# Patient Record
Sex: MALE | Race: WHITE | NOT HISPANIC OR LATINO | URBAN - METROPOLITAN AREA
[De-identification: names, ages, dates, MRNs, and addresses within clinical notes are randomized per-mention and may not be internally consistent; named-entity substitution may affect disease eponyms.]

---

## 2020-07-24 ENCOUNTER — OUTPATIENT (OUTPATIENT)
Dept: OUTPATIENT SERVICES | Facility: HOSPITAL | Age: 65
LOS: 1 days | End: 2020-07-24
Payer: COMMERCIAL

## 2020-07-24 VITALS
OXYGEN SATURATION: 98 % | HEART RATE: 72 BPM | RESPIRATION RATE: 16 BRPM | HEIGHT: 67 IN | TEMPERATURE: 97 F | WEIGHT: 194.01 LBS | DIASTOLIC BLOOD PRESSURE: 70 MMHG | SYSTOLIC BLOOD PRESSURE: 118 MMHG

## 2020-07-24 DIAGNOSIS — Z93.3 COLOSTOMY STATUS: Chronic | ICD-10-CM

## 2020-07-24 DIAGNOSIS — Z90.49 ACQUIRED ABSENCE OF OTHER SPECIFIED PARTS OF DIGESTIVE TRACT: Chronic | ICD-10-CM

## 2020-07-24 DIAGNOSIS — Z87.438 PERSONAL HISTORY OF OTHER DISEASES OF MALE GENITAL ORGANS: ICD-10-CM

## 2020-07-24 DIAGNOSIS — E78.5 HYPERLIPIDEMIA, UNSPECIFIED: ICD-10-CM

## 2020-07-24 DIAGNOSIS — D16.4 BENIGN NEOPLASM OF BONES OF SKULL AND FACE: ICD-10-CM

## 2020-07-24 DIAGNOSIS — I10 ESSENTIAL (PRIMARY) HYPERTENSION: ICD-10-CM

## 2020-07-24 DIAGNOSIS — Z98.890 OTHER SPECIFIED POSTPROCEDURAL STATES: Chronic | ICD-10-CM

## 2020-07-24 DIAGNOSIS — K08.409 PARTIAL LOSS OF TEETH, UNSPECIFIED CAUSE, UNSPECIFIED CLASS: Chronic | ICD-10-CM

## 2020-07-24 DIAGNOSIS — Z87.39 PERSONAL HISTORY OF OTHER DISEASES OF THE MUSCULOSKELETAL SYSTEM AND CONNECTIVE TISSUE: ICD-10-CM

## 2020-07-24 LAB
ANION GAP SERPL CALC-SCNC: 8 MMO/L — SIGNIFICANT CHANGE UP (ref 7–14)
BLD GP AB SCN SERPL QL: NEGATIVE — SIGNIFICANT CHANGE UP
BUN SERPL-MCNC: 21 MG/DL — SIGNIFICANT CHANGE UP (ref 7–23)
CALCIUM SERPL-MCNC: 9.7 MG/DL — SIGNIFICANT CHANGE UP (ref 8.4–10.5)
CHLORIDE SERPL-SCNC: 105 MMOL/L — SIGNIFICANT CHANGE UP (ref 98–107)
CO2 SERPL-SCNC: 26 MMOL/L — SIGNIFICANT CHANGE UP (ref 22–31)
CREAT SERPL-MCNC: 0.75 MG/DL — SIGNIFICANT CHANGE UP (ref 0.5–1.3)
GLUCOSE SERPL-MCNC: 96 MG/DL — SIGNIFICANT CHANGE UP (ref 70–99)
HCT VFR BLD CALC: 43 % — SIGNIFICANT CHANGE UP (ref 39–50)
HGB BLD-MCNC: 14 G/DL — SIGNIFICANT CHANGE UP (ref 13–17)
MCHC RBC-ENTMCNC: 28.7 PG — SIGNIFICANT CHANGE UP (ref 27–34)
MCHC RBC-ENTMCNC: 32.6 % — SIGNIFICANT CHANGE UP (ref 32–36)
MCV RBC AUTO: 88.1 FL — SIGNIFICANT CHANGE UP (ref 80–100)
NRBC # FLD: 0 K/UL — SIGNIFICANT CHANGE UP (ref 0–0)
PLATELET # BLD AUTO: 180 K/UL — SIGNIFICANT CHANGE UP (ref 150–400)
PMV BLD: 12.2 FL — SIGNIFICANT CHANGE UP (ref 7–13)
POTASSIUM SERPL-MCNC: 4.5 MMOL/L — SIGNIFICANT CHANGE UP (ref 3.5–5.3)
POTASSIUM SERPL-SCNC: 4.5 MMOL/L — SIGNIFICANT CHANGE UP (ref 3.5–5.3)
RBC # BLD: 4.88 M/UL — SIGNIFICANT CHANGE UP (ref 4.2–5.8)
RBC # FLD: 13.2 % — SIGNIFICANT CHANGE UP (ref 10.3–14.5)
RH IG SCN BLD-IMP: POSITIVE — SIGNIFICANT CHANGE UP
SODIUM SERPL-SCNC: 139 MMOL/L — SIGNIFICANT CHANGE UP (ref 135–145)
WBC # BLD: 6.23 K/UL — SIGNIFICANT CHANGE UP (ref 3.8–10.5)
WBC # FLD AUTO: 6.23 K/UL — SIGNIFICANT CHANGE UP (ref 3.8–10.5)

## 2020-07-24 PROCEDURE — 93010 ELECTROCARDIOGRAM REPORT: CPT

## 2020-07-24 RX ORDER — SODIUM CHLORIDE 9 MG/ML
1000 INJECTION, SOLUTION INTRAVENOUS
Refills: 0 | Status: DISCONTINUED | OUTPATIENT
Start: 2020-08-03 | End: 2020-08-15

## 2020-07-24 NOTE — H&P PST ADULT - ATTENDING COMMENTS
indicated for removal of cystic lesion of maxilla and associated teeth with bone graft region in OR under GA

## 2020-07-24 NOTE — H&P PST ADULT - NEGATIVE ENMT SYMPTOMS
no dysphagia/no nose bleeds/no throat pain/no ear pain/no tinnitus/no vertigo/no sinus symptoms/no hearing difficulty

## 2020-07-24 NOTE — H&P PST ADULT - NEGATIVE CARDIOVASCULAR SYMPTOMS
no paroxysmal nocturnal dyspnea/no chest pain/no dyspnea on exertion/no palpitations/no peripheral edema

## 2020-07-24 NOTE — H&P PST ADULT - CONSTITUTIONAL DETAILS
well-developed/well-nourished/well-groomed/no distress well-developed/well-groomed/well-nourished/obese/no distress

## 2020-07-24 NOTE — H&P PST ADULT - NSWEIGHTCALCTOOLDRUG_GEN_A_CORE
Problem: Patient Care Overview  Goal: Plan of Care Review  Outcome: Ongoing (interventions implemented as appropriate)  POC reviewed with the patient and daughter. Verbalized understanding. Fall precautions and safety maintained. No falls/injury this shift. No new skin impairments noted. AAOx1- oriented to self. Afebrile. VSS. Face mask and IVF @ 40 ml/hr maintained.  Pt progressing toward goals. No cognitive/physical changes noted overnight. Will continue to monitor.         used

## 2020-07-24 NOTE — H&P PST ADULT - RS GEN PE MLT RESP DETAILS PC
good air movement/breath sounds equal/airway patent/respirations non-labored/no rhonchi/clear to auscultation bilaterally/no rales/no wheezes

## 2020-07-24 NOTE — H&P PST ADULT - NSANTHOSAYNRD_GEN_A_CORE
No. CATRACHO screening performed.  STOP BANG Legend: 0-2 = LOW Risk; 3-4 = INTERMEDIATE Risk; 5-8 = HIGH Risk

## 2020-07-24 NOTE — H&P PST ADULT - NEGATIVE OPHTHALMOLOGIC SYMPTOMS
no pain R/no loss of vision L/no pain L/no loss of vision R/no photophobia/no blurred vision L/no diplopia/no blurred vision R

## 2020-07-24 NOTE — H&P PST ADULT - NSICDXPASTMEDICALHX_GEN_ALL_CORE_FT
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
## 2020-07-24 NOTE — H&P PST ADULT - MUSCULOSKELETAL
details… detailed exam no joint erythema/no joint swelling/no joint warmth/no calf tenderness/normal strength/ROM intact

## 2020-07-24 NOTE — H&P PST ADULT - NSICDXPROBLEM_GEN_ALL_CORE_FT
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 Pre-op instructions provided. Pt given verbal and written instructions with teach back on pepcid. Preop COVID instructions provided. Pt verbalized understanding with return demonstration.   CATRACHO precautions, OR booking notified  Pending cardiac evaluation due to abnormal EKG, h/o HTN, "heart valve problem" as per patient.  Pending copy of echo and stress test and comparison ekg.  Instructed patient obtain cardiac evaluation.     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 Pre-op instructions provided. Pt given verbal and written instructions with teach back on pepcid. Preop COVID instructions provided. Pt verbalized understanding with return demonstration.   ACTRACHO precautions, OR booking notified  Pending cardiac evaluation due to abnormal EKG, h/o HTN, "heart valve problem" as per patient. Notified [facility] surgical coordinator  Pending copy of echo and stress test and comparison ekg.  Instructed patient obtain cardiac evaluation.     Problem: History of BPH  Assessment and Plan: Pt instructed to take finasteride on the morning of procedure     Problem: Hypertension  Assessment and Plan: Pt instructed to take cardizem and lisinopril on the morning of procedure

## 2020-07-24 NOTE — H&P PST ADULT - NEGATIVE NEUROLOGICAL SYMPTOMS
no syncope/no generalized seizures/no weakness/no focal seizures/no difficulty walking/no paresthesias/no headache/no transient paralysis

## 2020-07-24 NOTE — H&P PST ADULT - HISTORY OF PRESENT ILLNESS
65 year old male presents to presurgical testing with diagnosis of benign neoplasm of bones of skull and face scheduled for extraction of teeth #8, 9, 10, 11, 12 maxillary cyst removal bone graft maxilla. Pt reports a maxillary cyst which a result of an old injury ate age 9 or 10.

## 2020-07-28 DIAGNOSIS — Z01.818 ENCOUNTER FOR OTHER PREPROCEDURAL EXAMINATION: ICD-10-CM

## 2020-07-28 PROBLEM — Z00.00 ENCOUNTER FOR PREVENTIVE HEALTH EXAMINATION: Status: ACTIVE | Noted: 2020-07-28

## 2020-07-30 ENCOUNTER — TRANSCRIPTION ENCOUNTER (OUTPATIENT)
Age: 65
End: 2020-07-30

## 2020-07-31 ENCOUNTER — OUTPATIENT (OUTPATIENT)
Dept: OUTPATIENT SERVICES | Facility: HOSPITAL | Age: 65
LOS: 1 days | Discharge: HOME | End: 2020-07-31
Payer: COMMERCIAL

## 2020-07-31 ENCOUNTER — APPOINTMENT (OUTPATIENT)
Dept: DISASTER EMERGENCY | Facility: CLINIC | Age: 65
End: 2020-07-31

## 2020-07-31 VITALS
OXYGEN SATURATION: 99 % | RESPIRATION RATE: 16 BRPM | HEART RATE: 86 BPM | DIASTOLIC BLOOD PRESSURE: 99 MMHG | SYSTOLIC BLOOD PRESSURE: 130 MMHG | TEMPERATURE: 99 F

## 2020-07-31 VITALS
HEIGHT: 70 IN | RESPIRATION RATE: 16 BRPM | TEMPERATURE: 99 F | HEART RATE: 92 BPM | SYSTOLIC BLOOD PRESSURE: 108 MMHG | OXYGEN SATURATION: 95 % | WEIGHT: 195.11 LBS | DIASTOLIC BLOOD PRESSURE: 67 MMHG

## 2020-07-31 DIAGNOSIS — Z93.3 COLOSTOMY STATUS: Chronic | ICD-10-CM

## 2020-07-31 DIAGNOSIS — Z90.49 ACQUIRED ABSENCE OF OTHER SPECIFIED PARTS OF DIGESTIVE TRACT: Chronic | ICD-10-CM

## 2020-07-31 DIAGNOSIS — D16.4 BENIGN NEOPLASM OF BONES OF SKULL AND FACE: ICD-10-CM

## 2020-07-31 DIAGNOSIS — Z98.890 OTHER SPECIFIED POSTPROCEDURAL STATES: Chronic | ICD-10-CM

## 2020-07-31 DIAGNOSIS — K08.409 PARTIAL LOSS OF TEETH, UNSPECIFIED CAUSE, UNSPECIFIED CLASS: Chronic | ICD-10-CM

## 2020-07-31 LAB — RH IG SCN BLD-IMP: POSITIVE — SIGNIFICANT CHANGE UP

## 2020-07-31 RX ORDER — LISINOPRIL 2.5 MG/1
40 TABLET ORAL DAILY
Refills: 0 | Status: DISCONTINUED | OUTPATIENT
Start: 2020-07-31 | End: 2020-07-31

## 2020-07-31 RX ORDER — FENTANYL CITRATE 50 UG/ML
25 INJECTION INTRAVENOUS
Refills: 0 | Status: DISCONTINUED | OUTPATIENT
Start: 2020-07-31 | End: 2020-07-31

## 2020-07-31 RX ORDER — DILTIAZEM HCL 120 MG
180 CAPSULE, EXT RELEASE 24 HR ORAL DAILY
Refills: 0 | Status: DISCONTINUED | OUTPATIENT
Start: 2020-07-31 | End: 2020-07-31

## 2020-07-31 RX ORDER — FENTANYL CITRATE 50 UG/ML
50 INJECTION INTRAVENOUS
Refills: 0 | Status: DISCONTINUED | OUTPATIENT
Start: 2020-07-31 | End: 2020-07-31

## 2020-07-31 RX ORDER — FINASTERIDE 5 MG/1
5 TABLET, FILM COATED ORAL DAILY
Refills: 0 | Status: DISCONTINUED | OUTPATIENT
Start: 2020-07-31 | End: 2020-07-31

## 2020-07-31 RX ORDER — SIMVASTATIN 20 MG/1
40 TABLET, FILM COATED ORAL AT BEDTIME
Refills: 0 | Status: DISCONTINUED | OUTPATIENT
Start: 2020-07-31 | End: 2020-07-31

## 2020-07-31 RX ORDER — ONDANSETRON 8 MG/1
4 TABLET, FILM COATED ORAL ONCE
Refills: 0 | Status: DISCONTINUED | OUTPATIENT
Start: 2020-07-31 | End: 2020-08-15

## 2020-07-31 RX ORDER — ACETAMINOPHEN 500 MG
650 TABLET ORAL
Qty: 0 | Refills: 0 | DISCHARGE

## 2020-07-31 RX ORDER — FINASTERIDE 5 MG/1
1 TABLET, FILM COATED ORAL
Qty: 0 | Refills: 0 | DISCHARGE

## 2020-07-31 RX ORDER — OXYCODONE HYDROCHLORIDE 5 MG/1
5 TABLET ORAL ONCE
Refills: 0 | Status: DISCONTINUED | OUTPATIENT
Start: 2020-07-31 | End: 2020-07-31

## 2020-07-31 NOTE — ASU DISCHARGE PLAN (ADULT/PEDIATRIC) - CARE PROVIDER_API CALL
Koko Whitman (DDS; MD)  OralMaxillofacial Surgery  [address]  [place] [address]  Phone: [phone]  Fax: [phone]  Follow Up Time:

## 2020-07-31 NOTE — H&P ADULT - ASSESSMENT
Assessment and Plan: Pt is scheduled for extraction of teeth #8, 9, 10, 11, 12 with removal of maxillary cyst and bone graft maxilla for 8/3/20 with Dr. Whitman.

## 2020-07-31 NOTE — ASU DISCHARGE PLAN (ADULT/PEDIATRIC) - CALL YOUR DOCTOR IF YOU HAVE ANY OF THE FOLLOWING:
Bleeding that does not stop/Swelling that gets worse/Pain not relieved by Medications/Numbness, tingling, color or temperature change to extremity Swelling that gets worse/Wound/Surgical Site with redness, or foul smelling discharge or pus/Pain not relieved by Medications/Bleeding that does not stop/Numbness, tingling, color or temperature change to extremity/Nausea and vomiting that does not stop/Unable to urinate/Fever greater than (need to indicate Fahrenheit or Celsius)

## 2020-07-31 NOTE — H&P ADULT - NSHPSOCIALHISTORY_GEN_ALL_CORE
Marital Status	  · Occupation	pneumatic   · Lives With	spouse     Substance Use History:  · Substance Use	caffeine  · Caffeine Type	coffee  · Caffeine Amount/Frequency	1-2 cups/cans per day     Alcohol Use History:  · Have you ever consumed alcohol	yes...  · Alcohol Frequency	monthly or less  · Problems Related to Alcohol Use	no  · 1. Have you felt you ought to CUT down on your drinking?	no  · 2. Have people ANNOYED you by criticizing your drinking?	no  · 3. Have you ever felt bad or GUILTY about your drinking?	no  · 4. Have you ever needed an "EYE OPENER", a drink first thing in the morning to steady your nerves or get rid of a hangover?	no     Tobacco Usage:  · Tobacco Usage: Current every day smoker  · Tobacco Type: vaping     Passive Smoke Exposure:  · Passive Smoke Exposure	No
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
## 2020-07-31 NOTE — ASU PREOP CHECKLIST - COMMENTS
had coffee with milk and sugar and roll with butter @ 6:30 am took pepcid and medications with food had coffee with milk and sugar and roll with butter @ 6:30 am took pepcid and medications with food DR Dos Santos made aware

## 2020-07-31 NOTE — H&P ADULT - NSHPPHYSICALEXAM_GEN_ALL_CORE
· Constitutional	well-developed; well-groomed; well-nourished; no distress; obese  · Eyes	PERRL; EOMI; conjunctiva clear  · ENMT	No oral lesions; no gross abnormalities  · Neck	supple  · Breasts	normal shape  · Back	normal shape; ROM intact; strength intact  · Respiratory	airway patent; breath sounds equal; good air movement; respirations non-labored; clear to auscultation bilaterally; no rales; no rhonchi; no wheezes  · Cardiovascular	regular rate and rhythm  no murmur  · Cardiovascular Details	positive S1; positive S2  · Gastrointestinal	soft; nontender; no distention; bowel sounds normal  · Gastrointestinal Comments	obese, healed surgical incision  · Genitourinary	patient refused  · Rectal	patient refused  · Extremities	no clubbing; no cyanosis; no pedal edema  · Vascular	  · Radial Pulse	right normal; left normal  · DP Pulse	right normal; left normal  · Neurological	alert and oriented x 3; sensation intact; cranial nerves intact  · Gait/Balance	steady gait  · Skin	warm and dry; color normal  · Lymph Nodes	No anterior cervical lymphadenopathy  · Musculoskeletal	ROM intact; no joint swelling; no joint erythema; no joint warmth; no calf tenderness; normal strength  · Psychiatric	normal affect; normal behavior

## 2020-07-31 NOTE — H&P ADULT - NSHPREVIEWOFSYSTEMS_GEN_ALL_CORE
· Negative General Symptoms  no fever; no chills; no sweating; no anorexia; no weight loss; no weight gain; no fatigue  · Negative Skin Symptoms       no rash; no itching  · Negative Breast Symptoms	no breast tenderness L; no breast tenderness R  · Negative Ophthalmologic Symptoms	no diplopia; no photophobia; no blurred vision L; no blurred vision R; no pain L; no pain R; no loss of vision L; no loss of vision R  · Negative ENMT Symptoms	no hearing difficulty; no ear pain; no tinnitus; no vertigo; no sinus symptoms; no nose bleeds; no throat pain; no dysphagia  · ENMT Comments	maxillary cyst  · Negative Respiratory and Thorax Symptoms	no wheezing; no dyspnea; no cough  · Negative Cardiovascular Symptoms	no chest pain; no palpitations; no dyspnea on exertion; no paroxysmal nocturnal dyspnea; no peripheral edema  · Cardiovascular Comments	HTN HLD  · Negative Gastrointestinal Symptoms	no nausea; no vomiting; no constipation; no change in bowel habits; no abdominal pain; no melena  · Gastrointestinal Symptoms	diarrhea; chronic  · Gastrointestinal Comments	h/o bowel perforation  · Negative General Genitourinary Symptoms	no hematuria; no renal colic; no bladder infections; no dysuria; normal urinary frequency  · General Genitourinary Symptoms	BPH  · Negative Musculoskeletal Symptoms	no arthralgia; no arthritis; no muscle cramps; no muscle weakness; no neck pain; no back pain  · Negative Neurological Symptoms	no transient paralysis; no weakness; no paresthesias; no generalized seizures; no focal seizures; no syncope; no difficulty walking; no headache  · Negative Psychiatric Symptoms	no suicidal ideation; no depression; no anxiety  · Negative Hematology Symptoms	no gum bleeding; no nose bleeding  · Negative Lymphatic Symptoms	no enlarged lymph nodes; no tender lymph nodes  · Negative Endocrine Symptoms	no cold intolerance; no heat intolerance  · Endocrine Comments	denies DM or thyroid dysfunction  · Allergy Types	see allergy section  · Negative Immunological Symptoms	no recurring infections; no persistent infections

## 2020-08-01 ENCOUNTER — RESULT REVIEW (OUTPATIENT)
Age: 65
End: 2020-08-01

## 2020-08-01 PROCEDURE — 88311 DECALCIFY TISSUE: CPT | Mod: 26

## 2020-08-01 PROCEDURE — 88305 TISSUE EXAM BY PATHOLOGIST: CPT | Mod: 26

## 2020-08-02 ENCOUNTER — TRANSCRIPTION ENCOUNTER (OUTPATIENT)
Age: 65
End: 2020-08-02

## 2022-08-21 ENCOUNTER — APPOINTMENT (OUTPATIENT)
Dept: ORTHOPEDIC SURGERY | Facility: CLINIC | Age: 67
End: 2022-08-21

## 2022-08-21 VITALS — WEIGHT: 195 LBS | BODY MASS INDEX: 27.92 KG/M2 | HEIGHT: 70 IN

## 2022-08-21 DIAGNOSIS — E78.00 PURE HYPERCHOLESTEROLEMIA, UNSPECIFIED: ICD-10-CM

## 2022-08-21 DIAGNOSIS — I10 ESSENTIAL (PRIMARY) HYPERTENSION: ICD-10-CM

## 2022-08-21 PROBLEM — E78.5 HYPERLIPIDEMIA, UNSPECIFIED: Chronic | Status: ACTIVE | Noted: 2020-07-24

## 2022-08-21 PROBLEM — Z87.438 PERSONAL HISTORY OF OTHER DISEASES OF MALE GENITAL ORGANS: Chronic | Status: ACTIVE | Noted: 2020-07-24

## 2022-08-21 PROBLEM — Z87.39 PERSONAL HISTORY OF OTHER DISEASES OF THE MUSCULOSKELETAL SYSTEM AND CONNECTIVE TISSUE: Chronic | Status: ACTIVE | Noted: 2020-07-24

## 2022-08-21 PROBLEM — K43.2 INCISIONAL HERNIA WITHOUT OBSTRUCTION OR GANGRENE: Chronic | Status: ACTIVE | Noted: 2020-07-24

## 2022-08-21 PROBLEM — K63.1 PERFORATION OF INTESTINE (NONTRAUMATIC): Chronic | Status: ACTIVE | Noted: 2020-07-24

## 2022-08-21 PROCEDURE — 99072 ADDL SUPL MATRL&STAF TM PHE: CPT

## 2022-08-21 PROCEDURE — 99203 OFFICE O/P NEW LOW 30 MIN: CPT | Mod: PA
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
## 2022-08-21 NOTE — ASSESSMENT
[FreeTextEntry1] : ABrasion dressed appropriately\par Recommend starting keflex\par Splint\par FU 3-4 days for re eval

## 2022-08-21 NOTE — PHYSICAL EXAM
[Right] : right hand [5th Finger] : 5th finger [5th Nail] : 5th nail [5th] : 5th [Distal Phalanx] : distal phalanx [] : affected fingertip almost touches palm [FreeTextEntry3] : superficial abrasion just below nailbed

## 2022-08-24 ENCOUNTER — APPOINTMENT (OUTPATIENT)
Dept: ORTHOPEDIC SURGERY | Facility: CLINIC | Age: 67
End: 2022-08-24

## 2022-08-24 VITALS — BODY MASS INDEX: 27.92 KG/M2 | WEIGHT: 195 LBS | HEIGHT: 70 IN

## 2022-08-24 DIAGNOSIS — Z78.9 OTHER SPECIFIED HEALTH STATUS: ICD-10-CM

## 2022-08-24 DIAGNOSIS — Z87.891 PERSONAL HISTORY OF NICOTINE DEPENDENCE: ICD-10-CM

## 2022-08-24 DIAGNOSIS — S62.666A NONDISPLACED FRACTURE OF DISTAL PHALANX OF RIGHT LITTLE FINGER, INITIAL ENCOUNTER FOR CLOSED FRACTURE: ICD-10-CM

## 2022-08-24 PROCEDURE — 99214 OFFICE O/P EST MOD 30 MIN: CPT

## 2022-08-24 PROCEDURE — 99072 ADDL SUPL MATRL&STAF TM PHE: CPT

## 2022-08-24 RX ORDER — SIMVASTATIN 40 MG/1
40 TABLET, FILM COATED ORAL
Qty: 90 | Refills: 0 | Status: ACTIVE | COMMUNITY
Start: 2021-09-16

## 2022-08-24 RX ORDER — LISINOPRIL 40 MG/1
40 TABLET ORAL
Qty: 90 | Refills: 0 | Status: ACTIVE | COMMUNITY
Start: 2022-05-17

## 2022-08-24 RX ORDER — FINASTERIDE 5 MG/1
5 TABLET, FILM COATED ORAL
Qty: 90 | Refills: 0 | Status: ACTIVE | COMMUNITY
Start: 2022-03-08

## 2022-08-24 RX ORDER — TADALAFIL 5 MG/1
5 TABLET ORAL
Qty: 90 | Refills: 0 | Status: ACTIVE | COMMUNITY
Start: 2022-08-15

## 2022-08-24 RX ORDER — VERAPAMIL HYDROCHLORIDE 120 MG/1
120 TABLET ORAL
Qty: 90 | Refills: 0 | Status: ACTIVE | COMMUNITY
Start: 2022-03-15

## 2022-08-24 NOTE — IMAGING
[de-identified] : Right small finger with ecchymosis at nail, loose nail. Sensation intact. No erythema nor drainage. +ttp. Able to flex/extend.\par \par Right small finger radiographs with tuft fracture.
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
## 2022-08-24 NOTE — ASSESSMENT
[FreeTextEntry1] : Right small finger crush injury with tuft fracture - reviewed radiographs with patient and discussed pathoanatomy. Will remain NWB, complete abx. Elevate, NSAIDs prn. Discussed risk of infection, nail injury, nail deformity, pain, swelling.\par \par F/u 2 weeks; reassess

## 2023-02-25 NOTE — H&P PST ADULT - NEGATIVE IMMUNOLOGICAL SYMPTOMS
Bedside and Verbal shift change report given to [address]  (oncoming nurse) by Odin Otero RN   (offgoing nurse). Report included the following information SBAR. no persistent infections/no recurring infections

## 2023-03-10 ENCOUNTER — OFFICE (OUTPATIENT)
Dept: URBAN - METROPOLITAN AREA CLINIC 70 | Facility: CLINIC | Age: 68
Setting detail: OPHTHALMOLOGY
End: 2023-03-10
Payer: MEDICARE

## 2023-03-10 DIAGNOSIS — H25.13: ICD-10-CM

## 2023-03-10 DIAGNOSIS — H35.54: ICD-10-CM

## 2023-03-10 DIAGNOSIS — H17.9: ICD-10-CM

## 2023-03-10 DIAGNOSIS — H16.223: ICD-10-CM

## 2023-03-10 PROCEDURE — 92250 FUNDUS PHOTOGRAPHY W/I&R: CPT | Performed by: STUDENT IN AN ORGANIZED HEALTH CARE EDUCATION/TRAINING PROGRAM

## 2023-03-10 PROCEDURE — 99213 OFFICE O/P EST LOW 20 MIN: CPT | Performed by: STUDENT IN AN ORGANIZED HEALTH CARE EDUCATION/TRAINING PROGRAM

## 2023-03-10 PROCEDURE — 92286 ANT SGM IMG I&R SPECLR MIC: CPT | Performed by: STUDENT IN AN ORGANIZED HEALTH CARE EDUCATION/TRAINING PROGRAM

## 2023-03-10 ASSESSMENT — REFRACTION_MANIFEST
OD_SPHERE: +4.50
OD_VA1: 20/50
OS_SPHERE: +2.50
OS_CYLINDER: -1.50
OD_AXIS: 115
OS_ADD: +2.75
OS_CYLINDER: -0.50
OD_CYLINDER: -1.00
OS_AXIS: 105
OD_AXIS: 150
OS_VA1: 20/30
OD_VA1: 20/40
OS_VA1: 20/50
OD_CYLINDER: -1.50
OS_SPHERE: +3.50
OD_ADD: +2.75
OS_AXIS: 090
OD_SPHERE: +4.50

## 2023-03-10 ASSESSMENT — KERATOMETRY
OD_K1POWER_DIOPTERS: 36.75
OS_K1POWER_DIOPTERS: 37.50
OD_AXISANGLE_DEGREES: 162
OS_K2POWER_DIOPTERS: 39.50
OD_K2POWER_DIOPTERS: 37.75
OS_AXISANGLE_DEGREES: 180

## 2023-03-10 ASSESSMENT — SPHEQUIV_DERIVED
OS_SPHEQUIV: 3.5
OD_SPHEQUIV: 4
OS_SPHEQUIV: 2.25
OD_SPHEQUIV: 4
OD_SPHEQUIV: 3.75
OS_SPHEQUIV: 2.75

## 2023-03-10 ASSESSMENT — REFRACTION_AUTOREFRACTION
OS_AXIS: 092
OD_AXIS: 146
OS_SPHERE: +4.25
OS_CYLINDER: -1.50
OD_CYLINDER: -1.50
OD_SPHERE: +4.75

## 2023-03-10 ASSESSMENT — SUPERFICIAL PUNCTATE KERATITIS (SPK)
OD_SPK: 1+
OS_SPK: 1+

## 2023-03-10 ASSESSMENT — AXIALLENGTH_DERIVED
OS_AL: 24.3827
OD_AL: 24.46
OD_AL: 24.35
OS_AL: 24.59
OD_AL: 24.35
OS_AL: 24.07

## 2023-03-10 ASSESSMENT — REFRACTION_CURRENTRX
OD_AXIS: 133
OS_OVR_VA: 20/
OD_OVR_VA: 20/
OD_ADD: +3.00
OS_ADD: +3.00
OD_CYLINDER: -1.25
OS_SPHERE: +2.50
OD_SPHERE: +3.75
OS_CYLINDER: SPH

## 2023-03-10 ASSESSMENT — CONFRONTATIONAL VISUAL FIELD TEST (CVF)
OS_FINDINGS: FULL
OD_FINDINGS: FULL

## 2023-03-10 ASSESSMENT — VISUAL ACUITY
OS_BCVA: 20/50
OD_BCVA: 20/50+1

## 2023-06-03 ENCOUNTER — INPATIENT (INPATIENT)
Facility: HOSPITAL | Age: 68
LOS: 8 days | Discharge: NURSING, REHAB OR LONG-TERM CARE | DRG: 480 | End: 2023-06-12
Attending: SURGERY | Admitting: ORTHOPAEDIC SURGERY
Payer: MEDICARE

## 2023-06-03 VITALS
DIASTOLIC BLOOD PRESSURE: 113 MMHG | TEMPERATURE: 98 F | RESPIRATION RATE: 16 BRPM | SYSTOLIC BLOOD PRESSURE: 170 MMHG | OXYGEN SATURATION: 98 % | HEART RATE: 85 BPM | WEIGHT: 199.96 LBS

## 2023-06-03 DIAGNOSIS — Z93.3 COLOSTOMY STATUS: Chronic | ICD-10-CM

## 2023-06-03 DIAGNOSIS — Z90.49 ACQUIRED ABSENCE OF OTHER SPECIFIED PARTS OF DIGESTIVE TRACT: Chronic | ICD-10-CM

## 2023-06-03 DIAGNOSIS — S72.40: ICD-10-CM

## 2023-06-03 DIAGNOSIS — Z98.890 OTHER SPECIFIED POSTPROCEDURAL STATES: Chronic | ICD-10-CM

## 2023-06-03 DIAGNOSIS — K08.409 PARTIAL LOSS OF TEETH, UNSPECIFIED CAUSE, UNSPECIFIED CLASS: Chronic | ICD-10-CM

## 2023-06-03 LAB
ALBUMIN SERPL ELPH-MCNC: 3.9 G/DL — SIGNIFICANT CHANGE UP (ref 3.3–5.2)
ALP SERPL-CCNC: 67 U/L — SIGNIFICANT CHANGE UP (ref 40–120)
ALT FLD-CCNC: 16 U/L — SIGNIFICANT CHANGE UP
ANION GAP SERPL CALC-SCNC: 11 MMOL/L — SIGNIFICANT CHANGE UP (ref 5–17)
APTT BLD: 27.9 SEC — SIGNIFICANT CHANGE UP (ref 27.5–35.5)
AST SERPL-CCNC: 22 U/L — SIGNIFICANT CHANGE UP
BASOPHILS # BLD AUTO: 0.05 K/UL — SIGNIFICANT CHANGE UP (ref 0–0.2)
BASOPHILS NFR BLD AUTO: 0.3 % — SIGNIFICANT CHANGE UP (ref 0–2)
BILIRUB SERPL-MCNC: 0.5 MG/DL — SIGNIFICANT CHANGE UP (ref 0.4–2)
BLD GP AB SCN SERPL QL: SIGNIFICANT CHANGE UP
BUN SERPL-MCNC: 25.2 MG/DL — HIGH (ref 8–20)
CALCIUM SERPL-MCNC: 9.3 MG/DL — SIGNIFICANT CHANGE UP (ref 8.4–10.5)
CHLORIDE SERPL-SCNC: 100 MMOL/L — SIGNIFICANT CHANGE UP (ref 96–108)
CO2 SERPL-SCNC: 25 MMOL/L — SIGNIFICANT CHANGE UP (ref 22–29)
CREAT SERPL-MCNC: 1.28 MG/DL — SIGNIFICANT CHANGE UP (ref 0.5–1.3)
EGFR: 61 ML/MIN/1.73M2 — SIGNIFICANT CHANGE UP
EOSINOPHIL # BLD AUTO: 0.03 K/UL — SIGNIFICANT CHANGE UP (ref 0–0.5)
EOSINOPHIL NFR BLD AUTO: 0.2 % — SIGNIFICANT CHANGE UP (ref 0–6)
GLUCOSE SERPL-MCNC: 131 MG/DL — HIGH (ref 70–99)
HCT VFR BLD CALC: 36.5 % — LOW (ref 39–50)
HGB BLD-MCNC: 11.7 G/DL — LOW (ref 13–17)
IMM GRANULOCYTES NFR BLD AUTO: 0.3 % — SIGNIFICANT CHANGE UP (ref 0–0.9)
INR BLD: 1.07 RATIO — SIGNIFICANT CHANGE UP (ref 0.88–1.16)
LYMPHOCYTES # BLD AUTO: 1.15 K/UL — SIGNIFICANT CHANGE UP (ref 1–3.3)
LYMPHOCYTES # BLD AUTO: 7.5 % — LOW (ref 13–44)
MCHC RBC-ENTMCNC: 27.9 PG — SIGNIFICANT CHANGE UP (ref 27–34)
MCHC RBC-ENTMCNC: 32.1 GM/DL — SIGNIFICANT CHANGE UP (ref 32–36)
MCV RBC AUTO: 86.9 FL — SIGNIFICANT CHANGE UP (ref 80–100)
MONOCYTES # BLD AUTO: 1 K/UL — HIGH (ref 0–0.9)
MONOCYTES NFR BLD AUTO: 6.6 % — SIGNIFICANT CHANGE UP (ref 2–14)
NEUTROPHILS # BLD AUTO: 12.97 K/UL — HIGH (ref 1.8–7.4)
NEUTROPHILS NFR BLD AUTO: 85.1 % — HIGH (ref 43–77)
PLATELET # BLD AUTO: 211 K/UL — SIGNIFICANT CHANGE UP (ref 150–400)
POTASSIUM SERPL-MCNC: 4.5 MMOL/L — SIGNIFICANT CHANGE UP (ref 3.5–5.3)
POTASSIUM SERPL-SCNC: 4.5 MMOL/L — SIGNIFICANT CHANGE UP (ref 3.5–5.3)
PROT SERPL-MCNC: 6.6 G/DL — SIGNIFICANT CHANGE UP (ref 6.6–8.7)
PROTHROM AB SERPL-ACNC: 12.4 SEC — SIGNIFICANT CHANGE UP (ref 10.5–13.4)
RBC # BLD: 4.2 M/UL — SIGNIFICANT CHANGE UP (ref 4.2–5.8)
RBC # FLD: 13.8 % — SIGNIFICANT CHANGE UP (ref 10.3–14.5)
SODIUM SERPL-SCNC: 136 MMOL/L — SIGNIFICANT CHANGE UP (ref 135–145)
WBC # BLD: 15.25 K/UL — HIGH (ref 3.8–10.5)
WBC # FLD AUTO: 15.25 K/UL — HIGH (ref 3.8–10.5)

## 2023-06-03 PROCEDURE — 71045 X-RAY EXAM CHEST 1 VIEW: CPT | Mod: 26

## 2023-06-03 PROCEDURE — 99223 1ST HOSP IP/OBS HIGH 75: CPT

## 2023-06-03 PROCEDURE — G1004: CPT

## 2023-06-03 PROCEDURE — 73700 CT LOWER EXTREMITY W/O DYE: CPT | Mod: 26,RT,MG

## 2023-06-03 PROCEDURE — 73562 X-RAY EXAM OF KNEE 3: CPT | Mod: 26,RT

## 2023-06-03 PROCEDURE — 93010 ELECTROCARDIOGRAM REPORT: CPT

## 2023-06-03 PROCEDURE — 72170 X-RAY EXAM OF PELVIS: CPT | Mod: 26

## 2023-06-03 PROCEDURE — 99291 CRITICAL CARE FIRST HOUR: CPT | Mod: FS

## 2023-06-03 PROCEDURE — 99222 1ST HOSP IP/OBS MODERATE 55: CPT

## 2023-06-03 RX ORDER — ONDANSETRON 8 MG/1
4 TABLET, FILM COATED ORAL ONCE
Refills: 0 | Status: COMPLETED | OUTPATIENT
Start: 2023-06-03 | End: 2023-06-03

## 2023-06-03 RX ORDER — OXYCODONE HYDROCHLORIDE 5 MG/1
10 TABLET ORAL EVERY 6 HOURS
Refills: 0 | Status: DISCONTINUED | OUTPATIENT
Start: 2023-06-03 | End: 2023-06-04

## 2023-06-03 RX ORDER — OXYCODONE HYDROCHLORIDE 5 MG/1
5 TABLET ORAL EVERY 6 HOURS
Refills: 0 | Status: DISCONTINUED | OUTPATIENT
Start: 2023-06-03 | End: 2023-06-04

## 2023-06-03 RX ORDER — CEFAZOLIN SODIUM 1 G
2000 VIAL (EA) INJECTION ONCE
Refills: 0 | Status: DISCONTINUED | OUTPATIENT
Start: 2023-06-04 | End: 2023-06-06

## 2023-06-03 RX ORDER — POVIDONE-IODINE 5 %
1 AEROSOL (ML) TOPICAL ONCE
Refills: 0 | Status: COMPLETED | OUTPATIENT
Start: 2023-06-03 | End: 2023-06-03

## 2023-06-03 RX ORDER — ACETAMINOPHEN 500 MG
650 TABLET ORAL ONCE
Refills: 0 | Status: COMPLETED | OUTPATIENT
Start: 2023-06-03 | End: 2023-06-03

## 2023-06-03 RX ORDER — SODIUM CHLORIDE 9 MG/ML
1000 INJECTION INTRAMUSCULAR; INTRAVENOUS; SUBCUTANEOUS ONCE
Refills: 0 | Status: COMPLETED | OUTPATIENT
Start: 2023-06-03 | End: 2023-06-03

## 2023-06-03 RX ORDER — FENTANYL CITRATE 50 UG/ML
100 INJECTION INTRAVENOUS ONCE
Refills: 0 | Status: DISCONTINUED | OUTPATIENT
Start: 2023-06-03 | End: 2023-06-03

## 2023-06-03 RX ORDER — HYDROMORPHONE HYDROCHLORIDE 2 MG/ML
0.5 INJECTION INTRAMUSCULAR; INTRAVENOUS; SUBCUTANEOUS ONCE
Refills: 0 | Status: DISCONTINUED | OUTPATIENT
Start: 2023-06-03 | End: 2023-06-03

## 2023-06-03 RX ORDER — VANCOMYCIN HCL 1 G
1500 VIAL (EA) INTRAVENOUS ONCE
Refills: 0 | Status: DISCONTINUED | OUTPATIENT
Start: 2023-06-04 | End: 2023-06-06

## 2023-06-03 RX ORDER — CHLORHEXIDINE GLUCONATE 213 G/1000ML
1 SOLUTION TOPICAL EVERY 12 HOURS
Refills: 0 | Status: COMPLETED | OUTPATIENT
Start: 2023-06-03 | End: 2023-06-04

## 2023-06-03 RX ORDER — ACETAMINOPHEN 500 MG
650 TABLET ORAL EVERY 6 HOURS
Refills: 0 | Status: DISCONTINUED | OUTPATIENT
Start: 2023-06-03 | End: 2023-06-04

## 2023-06-03 RX ORDER — MUPIROCIN 20 MG/G
1 OINTMENT TOPICAL
Refills: 0 | Status: DISCONTINUED | OUTPATIENT
Start: 2023-06-03 | End: 2023-06-06

## 2023-06-03 RX ORDER — MORPHINE SULFATE 50 MG/1
4 CAPSULE, EXTENDED RELEASE ORAL ONCE
Refills: 0 | Status: DISCONTINUED | OUTPATIENT
Start: 2023-06-03 | End: 2023-06-03

## 2023-06-03 RX ORDER — VERAPAMIL HCL 240 MG
120 CAPSULE, EXTENDED RELEASE PELLETS 24 HR ORAL ONCE
Refills: 0 | Status: COMPLETED | OUTPATIENT
Start: 2023-06-03 | End: 2023-06-03

## 2023-06-03 RX ADMIN — MORPHINE SULFATE 4 MILLIGRAM(S): 50 CAPSULE, EXTENDED RELEASE ORAL at 14:45

## 2023-06-03 RX ADMIN — Medication 650 MILLIGRAM(S): at 13:32

## 2023-06-03 RX ADMIN — ONDANSETRON 4 MILLIGRAM(S): 8 TABLET, FILM COATED ORAL at 14:44

## 2023-06-03 RX ADMIN — Medication 650 MILLIGRAM(S): at 14:22

## 2023-06-03 RX ADMIN — CHLORHEXIDINE GLUCONATE 1 APPLICATION(S): 213 SOLUTION TOPICAL at 20:46

## 2023-06-03 RX ADMIN — MORPHINE SULFATE 4 MILLIGRAM(S): 50 CAPSULE, EXTENDED RELEASE ORAL at 14:42

## 2023-06-03 RX ADMIN — ONDANSETRON 4 MILLIGRAM(S): 8 TABLET, FILM COATED ORAL at 21:45

## 2023-06-03 RX ADMIN — HYDROMORPHONE HYDROCHLORIDE 0.5 MILLIGRAM(S): 2 INJECTION INTRAMUSCULAR; INTRAVENOUS; SUBCUTANEOUS at 23:44

## 2023-06-03 RX ADMIN — FENTANYL CITRATE 100 MICROGRAM(S): 50 INJECTION INTRAVENOUS at 17:47

## 2023-06-03 RX ADMIN — ONDANSETRON 4 MILLIGRAM(S): 8 TABLET, FILM COATED ORAL at 17:18

## 2023-06-03 RX ADMIN — OXYCODONE HYDROCHLORIDE 10 MILLIGRAM(S): 5 TABLET ORAL at 21:47

## 2023-06-03 RX ADMIN — SODIUM CHLORIDE 1000 MILLILITER(S): 9 INJECTION INTRAMUSCULAR; INTRAVENOUS; SUBCUTANEOUS at 17:29

## 2023-06-03 RX ADMIN — OXYCODONE HYDROCHLORIDE 10 MILLIGRAM(S): 5 TABLET ORAL at 20:47

## 2023-06-03 RX ADMIN — FENTANYL CITRATE 100 MICROGRAM(S): 50 INJECTION INTRAVENOUS at 17:30

## 2023-06-03 NOTE — H&P ADULT - NSHPPHYSICALEXAM_GEN_ALL_CORE
Vital Signs Last 24 Hrs  T(C): 36.7 (03 Jun 2023 20:36), Max: 36.9 (03 Jun 2023 12:27)  T(F): 98 (03 Jun 2023 20:36), Max: 98.5 (03 Jun 2023 12:27)  HR: 98 (03 Jun 2023 20:36) (74 - 98)  BP: 108/73 (03 Jun 2023 20:36) (90/60 - 170/113)  BP(mean): --  RR: 18 (03 Jun 2023 20:36) (16 - 18)  SpO2: 96% (03 Jun 2023 20:36) (96% - 99%)    Parameters below as of 03 Jun 2023 20:36  Patient On (Oxygen Delivery Method): room air    Constitutional: Well-developed well nourished Male in no acute distress  HEENT: Head is normocephalic and atraumatic, maxillofacial structures stable, no chavarria sign / raccoon eyes,   Neck: trachea midline  Respiratory: Breath sounds CTA b/l respirations are unlabored, no accessory muscle use, no conversational dyspnea  Cardiovascular: Chest wall is non-tender to palpation, no subQ emphysema or crepitus palpated  Gastrointestinal: Abdomen soft, non-tender, non-distended, no rebound tenderness / guarding, no ecchymosis or external signs of abdominal trauma  Musculoskeletal: RLE in splint, moving toes and sensing normally, LLE with skin abrasion to knee and ecchymosis to shin  Pelvis: stable  Vascular: 2+ radial, femoral, and DP pulses b/l  Neurological: GCS: 15 (4/5/6). A&O x 3; no gross sensory / motor / coordination deficits  Neurospinal: no C/T/L/S spine tenderness to palpation, no step-offs or signs of external trauma to the back
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
## 2023-06-03 NOTE — ED ADULT NURSE REASSESSMENT NOTE - NS ED NURSE REASSESS COMMENT FT1
Patient reports decrease in pain, patient reports feeling tired, warm blanket provided, tolerating iv fluids well.

## 2023-06-03 NOTE — ED PROVIDER NOTE - OBJECTIVE STATEMENT
67 y/o M c/o pain in right knee s/p fall from ladder.  denies head trauma, LOC, chest pain, neck pain or back pain.  Patient does not take anticoagulants.

## 2023-06-03 NOTE — H&P ADULT - ATTENDING COMMENTS
I have seen and examined the patient around 20350 hrs  Details as above   Fall from ladder with right knee pain.  Trauma consult    On evaluation.  ABCD intact,   HD normal  abd sodt, pelvis stable  Right Knee on keen immobilizer cap refill < 2sec distal.      A/P  Right supracondylar femur fracture  No trauma contraindications for ORIF of femur,.  Evaluated by anesthesia and swapna not to require further work up before Or  all questions answered  Admit to trauma  DVT chemoprophylaxis

## 2023-06-03 NOTE — ED PROVIDER NOTE - CRITICAL CARE ATTENDING CONTRIBUTION TO CARE
I spent 35 minutes of critical care time with this patient. This does not include time spent on separately reported billable procedures.    I performed the initial face to face bedside interview with this patient regarding history of present illness, review of symptoms and relevant past medical, social and family history.  I completed an independent physical examination.  I was the initial provider who evaluated this patient. I have signed out the follow up of any pending tests (i.e. labs, radiological studies) to the ACP.  I have communicated the patient’s plan of care and disposition with the ACP.

## 2023-06-03 NOTE — H&P ADULT - NSHPLABSRESULTS_GEN_ALL_CORE
LABS  CBC (06-03 @ 14:35)                              11.7<L>                         15.25<H>  )----------------(  211        85.1<H>% Neutrophils, 7.5<L>% Lymphocytes, ANC: 12.97<H>                              36.5<L>    BMP (06-03 @ 14:35)             136     |  100     |  25.2<H>		Ca++ --      Ca 9.3                ---------------------------------( 131<H>		Mg --                 4.5     |  25.0    |  1.28  			Ph --        LFTs (06-03 @ 14:35)      TPro 6.6 / Alb 3.9 / TBili 0.5 / DBili -- / AST 22 / ALT 16 / AlkPhos 67    Coags (06-03 @ 14:35)  aPTT 27.9 / INR 1.07 / PT 12.4    --------------------------------------------------------------------------------------------    IMAGING  < from: Xray Knee 3 Views, Right (06.03.23 @ 14:05) >    There is a comminuted fracture of the lower femur some displacement of   the fragments.    There is fairly advanced right knee degeneration.    IMPRESSION: Comminuted fracture lower right femur. Fairly advanced right   knee degeneration.    < end of copied text >

## 2023-06-03 NOTE — ED ADULT NURSE NOTE - OBJECTIVE STATEMENT
A&OX4. BIBEMS with complaints of right knee pain, states that he was "renovating kitchen and the ladder he climbed fell at 11:30 am today, states he fell onto the kitchen floor and hit his right knee, denies hitting head, denies dizziness. He called his wife to call 911. Patient able to wiggle both toes, scratches noted to left knee and right knee appears swollen,  bilateral positive pedal pulses Unable to ambulate. States he takes an aspirin daily, hx of cardiomyopathy.

## 2023-06-03 NOTE — ED PROVIDER NOTE - CLINICAL SUMMARY MEDICAL DECISION MAKING FREE TEXT BOX
labs and diagnostic imaging results reviewed with patient; orthopedics assessment noted; will admit for operative repair
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
## 2023-06-03 NOTE — PRE-ANESTHESIA EVALUATION ADULT - NSANTHADDINFOFT_GEN_ALL_CORE
Patient seen and evaluated pre-operatively due to concerns for history of hypertrophic obstructive cardiomyopathy. Patient was able to provide most recent cardiac testing from [facility] via EHR on his phone, which I reviewed. AKILAH showing JUSTINA lesion and resultant moderate to severe mitral regurgitation. Normal heart function. Stress test negative.   Patient has no signs of active cardiac ischemia or heart failure. No need for additional cardiology evaluation or testing prior to OR tomorrow for distal femur ORIF. General anesthesia versus regional anesthesia up to the discretion of anesthesia provider day of surgery.  NPO after midnight, continue all BP / cardiac meds.

## 2023-06-03 NOTE — CONSULT NOTE ADULT - NS ATTEND AMEND GEN_ALL_CORE FT
Agree with PA note and plan as written.  Patient placed in knee immobilizer and distal femur fracture manipulate into a more appropriate position to ensure no nvi injury occurs.  Await optimization for OR and ct scan pending to evaluate interarticular extension and any other injuries.  Continue closed treatment with knee immobilizer, bedrest, SCDs and await OR. Agree with PA note and plan as written.  Patient placed in knee immobilizer and distal femur fracture manipulate into a more appropriate position to ensure no nvi injury occurs.  Await optimization for OR and ct scan pending to evaluate interarticular extension and any other injuries.  Continue closed treatment with knee immobilizer, bedrest, SCDs and await OR.        Orthopaedic Trauma Surgeon Addendum:    I have reviewed the physician assistant note and agree with the history, exam, and plan of care, except as noted.    Orthopedic Surgery is ready to proceed with surgery pending medical optimization and adequate Operating Room availability. Risks of surgical delay discussed with other providers and staff. Please call with any questions or concerns.     Pepe Rod MD  Orthopaedic Trauma Surgeon  [facility] [place]

## 2023-06-03 NOTE — H&P ADULT - HISTORY OF PRESENT ILLNESS
68M presenting s/p fall from 10ft on ladder. Patient was painting a skylight when the ladder he was standing on buckled and he fell on it, denies HS/LOC. He is unsure how he landed but his RLE hurt immediately and he was not able to bear weight. Otherwise no pain or complaints.

## 2023-06-03 NOTE — ED PROVIDER NOTE - CONSIDERATION OF ADMISSION OBSERVATION
Consideration of Admission/Observation distal femur fracture; orthopedics recommend urgent operative repair

## 2023-06-03 NOTE — ED ADULT TRIAGE NOTE - CHIEF COMPLAINT QUOTE
Pt BIBEMS c/o right knee pain after a ladder fell from underneath him. Unable to ambulate. Denies LOC, anticoagulation use.

## 2023-06-03 NOTE — H&P ADULT - ASSESSMENT
68M s/p fall from 10ft ladder with RLE comminuted distal femur fracture.    - Admit to Trauma Surgery under Dr. Mcneal  - Ortho consult: planning for operative intervention 6/4  - Reg diet, NPO pMN  - Tertiary in AM  - Patient seen and examined with attending  - Plan discussed with Dr. Mcneal

## 2023-06-03 NOTE — ED ADULT NURSE REASSESSMENT NOTE - NS ED NURSE REASSESS COMMENT FT1
received report from CAROL Christian, PT is A&Ox4, PT resting comfortably in Ed stretcher with out complaints of chest pain or shortness of breath, skin warm dry and unremarkable, brace in place on the Right leg CMS positive in affected leg. Updated pt on plan of care and pt expresses understanding.

## 2023-06-04 LAB
HCV AB S/CO SERPL IA: 0.06 S/CO — SIGNIFICANT CHANGE UP (ref 0–0.99)
HCV AB SERPL-IMP: SIGNIFICANT CHANGE UP
MRSA PCR RESULT.: SIGNIFICANT CHANGE UP
S AUREUS DNA NOSE QL NAA+PROBE: DETECTED

## 2023-06-04 PROCEDURE — 99231 SBSQ HOSP IP/OBS SF/LOW 25: CPT

## 2023-06-04 RX ORDER — ROSUVASTATIN CALCIUM 5 MG/1
1 TABLET ORAL
Refills: 0 | DISCHARGE

## 2023-06-04 RX ORDER — KETOROLAC TROMETHAMINE 30 MG/ML
15 SYRINGE (ML) INJECTION EVERY 6 HOURS
Refills: 0 | Status: DISCONTINUED | OUTPATIENT
Start: 2023-06-04 | End: 2023-06-06

## 2023-06-04 RX ORDER — IBUPROFEN 200 MG
1 TABLET ORAL
Qty: 0 | Refills: 0 | DISCHARGE

## 2023-06-04 RX ORDER — ACETAMINOPHEN 500 MG
1000 TABLET ORAL ONCE
Refills: 0 | Status: COMPLETED | OUTPATIENT
Start: 2023-06-04 | End: 2023-06-05

## 2023-06-04 RX ORDER — OMEPRAZOLE 10 MG/1
1 CAPSULE, DELAYED RELEASE ORAL
Refills: 0 | DISCHARGE

## 2023-06-04 RX ORDER — VERAPAMIL HCL 240 MG
1 CAPSULE, EXTENDED RELEASE PELLETS 24 HR ORAL
Refills: 0 | DISCHARGE

## 2023-06-04 RX ORDER — HYDROMORPHONE HYDROCHLORIDE 2 MG/ML
0.5 INJECTION INTRAMUSCULAR; INTRAVENOUS; SUBCUTANEOUS
Refills: 0 | Status: DISCONTINUED | OUTPATIENT
Start: 2023-06-04 | End: 2023-06-06

## 2023-06-04 RX ORDER — LISINOPRIL 2.5 MG/1
1 TABLET ORAL
Qty: 0 | Refills: 0 | DISCHARGE

## 2023-06-04 RX ORDER — PANTOPRAZOLE SODIUM 20 MG/1
40 TABLET, DELAYED RELEASE ORAL DAILY
Refills: 0 | Status: DISCONTINUED | OUTPATIENT
Start: 2023-06-04 | End: 2023-06-06

## 2023-06-04 RX ORDER — ACETAMINOPHEN 500 MG
1000 TABLET ORAL ONCE
Refills: 0 | Status: COMPLETED | OUTPATIENT
Start: 2023-06-04 | End: 2023-06-04

## 2023-06-04 RX ORDER — OXYCODONE HYDROCHLORIDE 5 MG/1
5 TABLET ORAL AT BEDTIME
Refills: 0 | Status: DISCONTINUED | OUTPATIENT
Start: 2023-06-04 | End: 2023-06-04

## 2023-06-04 RX ORDER — HYDROMORPHONE HYDROCHLORIDE 2 MG/ML
4 INJECTION INTRAMUSCULAR; INTRAVENOUS; SUBCUTANEOUS
Refills: 0 | Status: DISCONTINUED | OUTPATIENT
Start: 2023-06-04 | End: 2023-06-04

## 2023-06-04 RX ORDER — VERAPAMIL HCL 240 MG
120 CAPSULE, EXTENDED RELEASE PELLETS 24 HR ORAL EVERY 12 HOURS
Refills: 0 | Status: DISCONTINUED | OUTPATIENT
Start: 2023-06-04 | End: 2023-06-06

## 2023-06-04 RX ORDER — ENOXAPARIN SODIUM 100 MG/ML
40 INJECTION SUBCUTANEOUS EVERY 24 HOURS
Refills: 0 | Status: DISCONTINUED | OUTPATIENT
Start: 2023-06-04 | End: 2023-06-04

## 2023-06-04 RX ORDER — SODIUM CHLORIDE 9 MG/ML
1000 INJECTION INTRAMUSCULAR; INTRAVENOUS; SUBCUTANEOUS
Refills: 0 | Status: DISCONTINUED | OUTPATIENT
Start: 2023-06-04 | End: 2023-06-06

## 2023-06-04 RX ORDER — HYDROMORPHONE HYDROCHLORIDE 2 MG/ML
0.5 INJECTION INTRAMUSCULAR; INTRAVENOUS; SUBCUTANEOUS ONCE
Refills: 0 | Status: DISCONTINUED | OUTPATIENT
Start: 2023-06-04 | End: 2023-06-04

## 2023-06-04 RX ORDER — FINASTERIDE 5 MG/1
5 TABLET, FILM COATED ORAL EVERY 24 HOURS
Refills: 0 | Status: DISCONTINUED | OUTPATIENT
Start: 2023-06-04 | End: 2023-06-06

## 2023-06-04 RX ORDER — OXYCODONE HYDROCHLORIDE 5 MG/1
10 TABLET ORAL EVERY 6 HOURS
Refills: 0 | Status: DISCONTINUED | OUTPATIENT
Start: 2023-06-04 | End: 2023-06-04

## 2023-06-04 RX ORDER — DILTIAZEM HCL 120 MG
1 CAPSULE, EXT RELEASE 24 HR ORAL
Qty: 0 | Refills: 0 | DISCHARGE

## 2023-06-04 RX ORDER — ATORVASTATIN CALCIUM 80 MG/1
80 TABLET, FILM COATED ORAL AT BEDTIME
Refills: 0 | Status: DISCONTINUED | OUTPATIENT
Start: 2023-06-04 | End: 2023-06-05

## 2023-06-04 RX ORDER — KETOROLAC TROMETHAMINE 30 MG/ML
15 SYRINGE (ML) INJECTION EVERY 6 HOURS
Refills: 0 | Status: DISCONTINUED | OUTPATIENT
Start: 2023-06-04 | End: 2023-06-04

## 2023-06-04 RX ORDER — SIMVASTATIN 20 MG/1
1 TABLET, FILM COATED ORAL
Qty: 0 | Refills: 0 | DISCHARGE

## 2023-06-04 RX ORDER — HYDROMORPHONE HYDROCHLORIDE 2 MG/ML
4 INJECTION INTRAMUSCULAR; INTRAVENOUS; SUBCUTANEOUS
Refills: 0 | Status: DISCONTINUED | OUTPATIENT
Start: 2023-06-04 | End: 2023-06-06

## 2023-06-04 RX ORDER — ASPIRIN/CALCIUM CARB/MAGNESIUM 324 MG
81 TABLET ORAL DAILY
Refills: 0 | Status: DISCONTINUED | OUTPATIENT
Start: 2023-06-04 | End: 2023-06-06

## 2023-06-04 RX ORDER — OXYCODONE HYDROCHLORIDE 5 MG/1
5 TABLET ORAL EVERY 4 HOURS
Refills: 0 | Status: DISCONTINUED | OUTPATIENT
Start: 2023-06-04 | End: 2023-06-04

## 2023-06-04 RX ORDER — OXYCODONE HYDROCHLORIDE 5 MG/1
10 TABLET ORAL
Refills: 0 | Status: DISCONTINUED | OUTPATIENT
Start: 2023-06-04 | End: 2023-06-04

## 2023-06-04 RX ADMIN — Medication 1000 MILLIGRAM(S): at 07:14

## 2023-06-04 RX ADMIN — HYDROMORPHONE HYDROCHLORIDE 4 MILLIGRAM(S): 2 INJECTION INTRAMUSCULAR; INTRAVENOUS; SUBCUTANEOUS at 14:12

## 2023-06-04 RX ADMIN — Medication 120 MILLIGRAM(S): at 17:40

## 2023-06-04 RX ADMIN — Medication 81 MILLIGRAM(S): at 11:50

## 2023-06-04 RX ADMIN — ATORVASTATIN CALCIUM 80 MILLIGRAM(S): 80 TABLET, FILM COATED ORAL at 21:59

## 2023-06-04 RX ADMIN — CHLORHEXIDINE GLUCONATE 1 APPLICATION(S): 213 SOLUTION TOPICAL at 05:25

## 2023-06-04 RX ADMIN — OXYCODONE HYDROCHLORIDE 10 MILLIGRAM(S): 5 TABLET ORAL at 03:43

## 2023-06-04 RX ADMIN — Medication 400 MILLIGRAM(S): at 06:40

## 2023-06-04 RX ADMIN — HYDROMORPHONE HYDROCHLORIDE 0.5 MILLIGRAM(S): 2 INJECTION INTRAMUSCULAR; INTRAVENOUS; SUBCUTANEOUS at 22:24

## 2023-06-04 RX ADMIN — HYDROMORPHONE HYDROCHLORIDE 0.5 MILLIGRAM(S): 2 INJECTION INTRAMUSCULAR; INTRAVENOUS; SUBCUTANEOUS at 15:52

## 2023-06-04 RX ADMIN — HYDROMORPHONE HYDROCHLORIDE 0.5 MILLIGRAM(S): 2 INJECTION INTRAMUSCULAR; INTRAVENOUS; SUBCUTANEOUS at 15:22

## 2023-06-04 RX ADMIN — OXYCODONE HYDROCHLORIDE 10 MILLIGRAM(S): 5 TABLET ORAL at 04:43

## 2023-06-04 RX ADMIN — HYDROMORPHONE HYDROCHLORIDE 0.5 MILLIGRAM(S): 2 INJECTION INTRAMUSCULAR; INTRAVENOUS; SUBCUTANEOUS at 22:09

## 2023-06-04 RX ADMIN — SODIUM CHLORIDE 100 MILLILITER(S): 9 INJECTION INTRAMUSCULAR; INTRAVENOUS; SUBCUTANEOUS at 22:01

## 2023-06-04 RX ADMIN — HYDROMORPHONE HYDROCHLORIDE 4 MILLIGRAM(S): 2 INJECTION INTRAMUSCULAR; INTRAVENOUS; SUBCUTANEOUS at 13:12

## 2023-06-04 RX ADMIN — PANTOPRAZOLE SODIUM 40 MILLIGRAM(S): 20 TABLET, DELAYED RELEASE ORAL at 05:25

## 2023-06-04 RX ADMIN — HYDROMORPHONE HYDROCHLORIDE 0.5 MILLIGRAM(S): 2 INJECTION INTRAMUSCULAR; INTRAVENOUS; SUBCUTANEOUS at 10:09

## 2023-06-04 RX ADMIN — HYDROMORPHONE HYDROCHLORIDE 0.5 MILLIGRAM(S): 2 INJECTION INTRAMUSCULAR; INTRAVENOUS; SUBCUTANEOUS at 00:44

## 2023-06-04 RX ADMIN — Medication 400 MILLIGRAM(S): at 02:11

## 2023-06-04 RX ADMIN — HYDROMORPHONE HYDROCHLORIDE 4 MILLIGRAM(S): 2 INJECTION INTRAMUSCULAR; INTRAVENOUS; SUBCUTANEOUS at 19:29

## 2023-06-04 RX ADMIN — HYDROMORPHONE HYDROCHLORIDE 4 MILLIGRAM(S): 2 INJECTION INTRAMUSCULAR; INTRAVENOUS; SUBCUTANEOUS at 08:01

## 2023-06-04 RX ADMIN — Medication 1000 MILLIGRAM(S): at 03:00

## 2023-06-04 RX ADMIN — FINASTERIDE 5 MILLIGRAM(S): 5 TABLET, FILM COATED ORAL at 09:39

## 2023-06-04 RX ADMIN — HYDROMORPHONE HYDROCHLORIDE 0.5 MILLIGRAM(S): 2 INJECTION INTRAMUSCULAR; INTRAVENOUS; SUBCUTANEOUS at 09:39

## 2023-06-04 RX ADMIN — HYDROMORPHONE HYDROCHLORIDE 4 MILLIGRAM(S): 2 INJECTION INTRAMUSCULAR; INTRAVENOUS; SUBCUTANEOUS at 09:01

## 2023-06-04 NOTE — PATIENT PROFILE ADULT - FALL HARM RISK - HARM RISK INTERVENTIONS

## 2023-06-04 NOTE — PROGRESS NOTE ADULT - NS ATTEND AMEND GEN_ALL_CORE FT
Agree with above assessment.  The patient was seen and examined by myself with the surgical PA.  The patient is with pain in the right thigh. He states that the pain meds are not holding long enough.  He is for OR for fixation today with ortho.  Will adjust pain control accordingly. Otherwise trauma stable.

## 2023-06-04 NOTE — PROGRESS NOTE ADULT - ASSESSMENT
Assessment: 68M s/p fall from 10ft ladder with RLE comminuted distal femur fracture.    - NPO/IVF  - OR today with Ortho  - PT/OT after OR  - Tertiary later today  - Dipso pending above

## 2023-06-04 NOTE — CHART NOTE - NSCHARTNOTEFT_GEN_A_CORE
Received call from RN stating that patients wife went down to cafeteria and brought food for patient. Patient seen. Patient states that he had to eat because he was becoming sick. I explained to patient that surgery will not be done tonight and that we will have to repeat the process tomorrow and plan for OR. Patient understands.

## 2023-06-04 NOTE — PROGRESS NOTE ADULT - SUBJECTIVE AND OBJECTIVE BOX
TRAUMA SURGERY PROGRESS NOTE    Subjective: Patient examined at bedside this AM. Reports his pain was minimally controlled but his nausea has improved some. No acute events overnight    Objective:  Vital Signs  T(C): 37.1 (06-04 @ 03:40), Max: 37.1 (06-04 @ 03:40)  HR: 88 (06-04 @ 03:40) (74 - 98)  BP: 122/74 (06-04 @ 03:40) (90/60 - 170/113)  RR: 18 (06-04 @ 03:40) (16 - 18)  SpO2: 96% (06-04 @ 03:40) (95% - 99%)      Physical Exam:  General: alert and oriented, NAD  Resp: airway patent, respirations unlabored  Abdomen: soft, nontender, nondistended  Extremities: no edema, RLE in splint  Skin: warm, dry, appropriate color      Labs:                        11.7   15.25 )-----------( 211      ( 03 Jun 2023 14:35 )             36.5   06-03    136  |  100  |  25.2<H>  ----------------------------<  131<H>  4.5   |  25.0  |  1.28    Ca    9.3      03 Jun 2023 14:35    TPro  6.6  /  Alb  3.9  /  TBili  0.5  /  DBili  x   /  AST  22  /  ALT  16  /  AlkPhos  67  06-03

## 2023-06-05 ENCOUNTER — TRANSCRIPTION ENCOUNTER (OUTPATIENT)
Age: 68
End: 2023-06-05

## 2023-06-05 DIAGNOSIS — I42.1 OBSTRUCTIVE HYPERTROPHIC CARDIOMYOPATHY: ICD-10-CM

## 2023-06-05 DIAGNOSIS — Z01.810 ENCOUNTER FOR PREPROCEDURAL CARDIOVASCULAR EXAMINATION: ICD-10-CM

## 2023-06-05 LAB
ABO RH CONFIRMATION: SIGNIFICANT CHANGE UP
ALBUMIN SERPL ELPH-MCNC: 3 G/DL — LOW (ref 3.3–5.2)
ALP SERPL-CCNC: 46 U/L — SIGNIFICANT CHANGE UP (ref 40–120)
ALT FLD-CCNC: 13 U/L — SIGNIFICANT CHANGE UP
ANION GAP SERPL CALC-SCNC: 8 MMOL/L — SIGNIFICANT CHANGE UP (ref 5–17)
ANION GAP SERPL CALC-SCNC: 8 MMOL/L — SIGNIFICANT CHANGE UP (ref 5–17)
ANISOCYTOSIS BLD QL: SIGNIFICANT CHANGE UP
ANISOCYTOSIS BLD QL: SLIGHT — SIGNIFICANT CHANGE UP
AST SERPL-CCNC: 18 U/L — SIGNIFICANT CHANGE UP
BASOPHILS # BLD AUTO: 0.01 K/UL — SIGNIFICANT CHANGE UP (ref 0–0.2)
BASOPHILS # BLD AUTO: 0.02 K/UL — SIGNIFICANT CHANGE UP (ref 0–0.2)
BASOPHILS # BLD AUTO: 0.13 K/UL — SIGNIFICANT CHANGE UP (ref 0–0.2)
BASOPHILS NFR BLD AUTO: 0.1 % — SIGNIFICANT CHANGE UP (ref 0–2)
BASOPHILS NFR BLD AUTO: 0.1 % — SIGNIFICANT CHANGE UP (ref 0–2)
BASOPHILS NFR BLD AUTO: 0.9 % — SIGNIFICANT CHANGE UP (ref 0–2)
BILIRUB SERPL-MCNC: 1.2 MG/DL — SIGNIFICANT CHANGE UP (ref 0.4–2)
BUN SERPL-MCNC: 27.9 MG/DL — HIGH (ref 8–20)
BUN SERPL-MCNC: 41.9 MG/DL — HIGH (ref 8–20)
CALCIUM SERPL-MCNC: 7.6 MG/DL — LOW (ref 8.4–10.5)
CALCIUM SERPL-MCNC: 8 MG/DL — LOW (ref 8.4–10.5)
CHLORIDE SERPL-SCNC: 100 MMOL/L — SIGNIFICANT CHANGE UP (ref 96–108)
CHLORIDE SERPL-SCNC: 103 MMOL/L — SIGNIFICANT CHANGE UP (ref 96–108)
CO2 SERPL-SCNC: 25 MMOL/L — SIGNIFICANT CHANGE UP (ref 22–29)
CO2 SERPL-SCNC: 26 MMOL/L — SIGNIFICANT CHANGE UP (ref 22–29)
CREAT SERPL-MCNC: 1 MG/DL — SIGNIFICANT CHANGE UP (ref 0.5–1.3)
CREAT SERPL-MCNC: 1.43 MG/DL — HIGH (ref 0.5–1.3)
EGFR: 53 ML/MIN/1.73M2 — LOW
EGFR: 81 ML/MIN/1.73M2 — SIGNIFICANT CHANGE UP
ELLIPTOCYTES BLD QL SMEAR: SLIGHT — SIGNIFICANT CHANGE UP
EOSINOPHIL # BLD AUTO: 0 K/UL — SIGNIFICANT CHANGE UP (ref 0–0.5)
EOSINOPHIL # BLD AUTO: 0.01 K/UL — SIGNIFICANT CHANGE UP (ref 0–0.5)
EOSINOPHIL # BLD AUTO: 0.01 K/UL — SIGNIFICANT CHANGE UP (ref 0–0.5)
EOSINOPHIL NFR BLD AUTO: 0 % — SIGNIFICANT CHANGE UP (ref 0–6)
EOSINOPHIL NFR BLD AUTO: 0.1 % — SIGNIFICANT CHANGE UP (ref 0–6)
EOSINOPHIL NFR BLD AUTO: 0.1 % — SIGNIFICANT CHANGE UP (ref 0–6)
GIANT PLATELETS BLD QL SMEAR: PRESENT — SIGNIFICANT CHANGE UP
GLUCOSE SERPL-MCNC: 127 MG/DL — HIGH (ref 70–99)
GLUCOSE SERPL-MCNC: 144 MG/DL — HIGH (ref 70–99)
HCT VFR BLD CALC: 18.8 % — CRITICAL LOW (ref 39–50)
HCT VFR BLD CALC: 18.9 % — CRITICAL LOW (ref 39–50)
HCT VFR BLD CALC: 24.5 % — LOW (ref 39–50)
HGB BLD-MCNC: 6.2 G/DL — CRITICAL LOW (ref 13–17)
HGB BLD-MCNC: 6.2 G/DL — CRITICAL LOW (ref 13–17)
HGB BLD-MCNC: 8.2 G/DL — LOW (ref 13–17)
HYPOCHROMIA BLD QL: SLIGHT — SIGNIFICANT CHANGE UP
IMM GRANULOCYTES NFR BLD AUTO: 0.7 % — SIGNIFICANT CHANGE UP (ref 0–0.9)
IMM GRANULOCYTES NFR BLD AUTO: 0.9 % — SIGNIFICANT CHANGE UP (ref 0–0.9)
INR BLD: 1.07 RATIO — SIGNIFICANT CHANGE UP (ref 0.88–1.16)
LYMPHOCYTES # BLD AUTO: 0.39 K/UL — LOW (ref 1–3.3)
LYMPHOCYTES # BLD AUTO: 0.87 K/UL — LOW (ref 1–3.3)
LYMPHOCYTES # BLD AUTO: 0.91 K/UL — LOW (ref 1–3.3)
LYMPHOCYTES # BLD AUTO: 2.6 % — LOW (ref 13–44)
LYMPHOCYTES # BLD AUTO: 6.1 % — LOW (ref 13–44)
LYMPHOCYTES # BLD AUTO: 6.1 % — LOW (ref 13–44)
MAGNESIUM SERPL-MCNC: 2 MG/DL — SIGNIFICANT CHANGE UP (ref 1.6–2.6)
MANUAL SMEAR VERIFICATION: SIGNIFICANT CHANGE UP
MANUAL SMEAR VERIFICATION: SIGNIFICANT CHANGE UP
MCHC RBC-ENTMCNC: 28.4 PG — SIGNIFICANT CHANGE UP (ref 27–34)
MCHC RBC-ENTMCNC: 28.7 PG — SIGNIFICANT CHANGE UP (ref 27–34)
MCHC RBC-ENTMCNC: 29.1 PG — SIGNIFICANT CHANGE UP (ref 27–34)
MCHC RBC-ENTMCNC: 32.8 GM/DL — SIGNIFICANT CHANGE UP (ref 32–36)
MCHC RBC-ENTMCNC: 33 GM/DL — SIGNIFICANT CHANGE UP (ref 32–36)
MCHC RBC-ENTMCNC: 33.5 GM/DL — SIGNIFICANT CHANGE UP (ref 32–36)
MCV RBC AUTO: 86.2 FL — SIGNIFICANT CHANGE UP (ref 80–100)
MCV RBC AUTO: 86.9 FL — SIGNIFICANT CHANGE UP (ref 80–100)
MCV RBC AUTO: 87.5 FL — SIGNIFICANT CHANGE UP (ref 80–100)
MICROCYTES BLD QL: SIGNIFICANT CHANGE UP
MICROCYTES BLD QL: SLIGHT — SIGNIFICANT CHANGE UP
MONOCYTES # BLD AUTO: 0.39 K/UL — SIGNIFICANT CHANGE UP (ref 0–0.9)
MONOCYTES # BLD AUTO: 1.4 K/UL — HIGH (ref 0–0.9)
MONOCYTES # BLD AUTO: 1.77 K/UL — HIGH (ref 0–0.9)
MONOCYTES NFR BLD AUTO: 11.9 % — SIGNIFICANT CHANGE UP (ref 2–14)
MONOCYTES NFR BLD AUTO: 2.6 % — SIGNIFICANT CHANGE UP (ref 2–14)
MONOCYTES NFR BLD AUTO: 9.8 % — SIGNIFICANT CHANGE UP (ref 2–14)
NEUTROPHILS # BLD AUTO: 11.82 K/UL — HIGH (ref 1.8–7.4)
NEUTROPHILS # BLD AUTO: 12.06 K/UL — HIGH (ref 1.8–7.4)
NEUTROPHILS # BLD AUTO: 13.99 K/UL — HIGH (ref 1.8–7.4)
NEUTROPHILS NFR BLD AUTO: 81.1 % — HIGH (ref 43–77)
NEUTROPHILS NFR BLD AUTO: 83 % — HIGH (ref 43–77)
NEUTROPHILS NFR BLD AUTO: 93.9 % — HIGH (ref 43–77)
OVALOCYTES BLD QL SMEAR: SLIGHT — SIGNIFICANT CHANGE UP
OVALOCYTES BLD QL SMEAR: SLIGHT — SIGNIFICANT CHANGE UP
PHOSPHATE SERPL-MCNC: 2.5 MG/DL — SIGNIFICANT CHANGE UP (ref 2.4–4.7)
PLAT MORPH BLD: NORMAL — SIGNIFICANT CHANGE UP
PLAT MORPH BLD: NORMAL — SIGNIFICANT CHANGE UP
PLATELET # BLD AUTO: 110 K/UL — LOW (ref 150–400)
PLATELET # BLD AUTO: 116 K/UL — LOW (ref 150–400)
PLATELET # BLD AUTO: 128 K/UL — LOW (ref 150–400)
POIKILOCYTOSIS BLD QL AUTO: SLIGHT — SIGNIFICANT CHANGE UP
POIKILOCYTOSIS BLD QL AUTO: SLIGHT — SIGNIFICANT CHANGE UP
POLYCHROMASIA BLD QL SMEAR: SLIGHT — SIGNIFICANT CHANGE UP
POLYCHROMASIA BLD QL SMEAR: SLIGHT — SIGNIFICANT CHANGE UP
POTASSIUM SERPL-MCNC: 3.9 MMOL/L — SIGNIFICANT CHANGE UP (ref 3.5–5.3)
POTASSIUM SERPL-MCNC: 4.2 MMOL/L — SIGNIFICANT CHANGE UP (ref 3.5–5.3)
POTASSIUM SERPL-SCNC: 3.9 MMOL/L — SIGNIFICANT CHANGE UP (ref 3.5–5.3)
POTASSIUM SERPL-SCNC: 4.2 MMOL/L — SIGNIFICANT CHANGE UP (ref 3.5–5.3)
PROT SERPL-MCNC: 5.4 G/DL — LOW (ref 6.6–8.7)
PROTHROM AB SERPL-ACNC: 12.4 SEC — SIGNIFICANT CHANGE UP (ref 10.5–13.4)
RBC # BLD: 2.16 M/UL — LOW (ref 4.2–5.8)
RBC # BLD: 2.18 M/UL — LOW (ref 4.2–5.8)
RBC # BLD: 2.82 M/UL — LOW (ref 4.2–5.8)
RBC # FLD: 14 % — SIGNIFICANT CHANGE UP (ref 10.3–14.5)
RBC # FLD: 14.3 % — SIGNIFICANT CHANGE UP (ref 10.3–14.5)
RBC # FLD: 14.3 % — SIGNIFICANT CHANGE UP (ref 10.3–14.5)
RBC BLD AUTO: ABNORMAL
RBC BLD AUTO: ABNORMAL
SODIUM SERPL-SCNC: 134 MMOL/L — LOW (ref 135–145)
SODIUM SERPL-SCNC: 136 MMOL/L — SIGNIFICANT CHANGE UP (ref 135–145)
WBC # BLD: 14.24 K/UL — HIGH (ref 3.8–10.5)
WBC # BLD: 14.87 K/UL — HIGH (ref 3.8–10.5)
WBC # BLD: 14.9 K/UL — HIGH (ref 3.8–10.5)
WBC # FLD AUTO: 14.24 K/UL — HIGH (ref 3.8–10.5)
WBC # FLD AUTO: 14.87 K/UL — HIGH (ref 3.8–10.5)
WBC # FLD AUTO: 14.9 K/UL — HIGH (ref 3.8–10.5)

## 2023-06-05 PROCEDURE — 99223 1ST HOSP IP/OBS HIGH 75: CPT | Mod: FS

## 2023-06-05 PROCEDURE — 99232 SBSQ HOSP IP/OBS MODERATE 35: CPT | Mod: 57

## 2023-06-05 PROCEDURE — 93306 TTE W/DOPPLER COMPLETE: CPT | Mod: 26

## 2023-06-05 RX ORDER — ALBUMIN HUMAN 25 %
250 VIAL (ML) INTRAVENOUS ONCE
Refills: 0 | Status: COMPLETED | OUTPATIENT
Start: 2023-06-05 | End: 2023-06-05

## 2023-06-05 RX ORDER — VERAPAMIL HCL 240 MG
120 CAPSULE, EXTENDED RELEASE PELLETS 24 HR ORAL ONCE
Refills: 0 | Status: COMPLETED | OUTPATIENT
Start: 2023-06-05 | End: 2023-06-05

## 2023-06-05 RX ADMIN — SODIUM CHLORIDE 100 MILLILITER(S): 9 INJECTION INTRAMUSCULAR; INTRAVENOUS; SUBCUTANEOUS at 05:48

## 2023-06-05 RX ADMIN — HYDROMORPHONE HYDROCHLORIDE 4 MILLIGRAM(S): 2 INJECTION INTRAMUSCULAR; INTRAVENOUS; SUBCUTANEOUS at 21:14

## 2023-06-05 RX ADMIN — HYDROMORPHONE HYDROCHLORIDE 4 MILLIGRAM(S): 2 INJECTION INTRAMUSCULAR; INTRAVENOUS; SUBCUTANEOUS at 03:42

## 2023-06-05 RX ADMIN — MUPIROCIN 1 APPLICATION(S): 20 OINTMENT TOPICAL at 18:31

## 2023-06-05 RX ADMIN — Medication 125 MILLILITER(S): at 22:30

## 2023-06-05 RX ADMIN — HYDROMORPHONE HYDROCHLORIDE 0.5 MILLIGRAM(S): 2 INJECTION INTRAMUSCULAR; INTRAVENOUS; SUBCUTANEOUS at 06:00

## 2023-06-05 RX ADMIN — HYDROMORPHONE HYDROCHLORIDE 4 MILLIGRAM(S): 2 INJECTION INTRAMUSCULAR; INTRAVENOUS; SUBCUTANEOUS at 22:14

## 2023-06-05 RX ADMIN — HYDROMORPHONE HYDROCHLORIDE 4 MILLIGRAM(S): 2 INJECTION INTRAMUSCULAR; INTRAVENOUS; SUBCUTANEOUS at 08:22

## 2023-06-05 RX ADMIN — FINASTERIDE 5 MILLIGRAM(S): 5 TABLET, FILM COATED ORAL at 10:52

## 2023-06-05 RX ADMIN — HYDROMORPHONE HYDROCHLORIDE 4 MILLIGRAM(S): 2 INJECTION INTRAMUSCULAR; INTRAVENOUS; SUBCUTANEOUS at 09:22

## 2023-06-05 RX ADMIN — PANTOPRAZOLE SODIUM 40 MILLIGRAM(S): 20 TABLET, DELAYED RELEASE ORAL at 12:29

## 2023-06-05 RX ADMIN — Medication 400 MILLIGRAM(S): at 15:12

## 2023-06-05 RX ADMIN — SODIUM CHLORIDE 100 MILLILITER(S): 9 INJECTION INTRAMUSCULAR; INTRAVENOUS; SUBCUTANEOUS at 18:28

## 2023-06-05 RX ADMIN — HYDROMORPHONE HYDROCHLORIDE 4 MILLIGRAM(S): 2 INJECTION INTRAMUSCULAR; INTRAVENOUS; SUBCUTANEOUS at 04:27

## 2023-06-05 RX ADMIN — HYDROMORPHONE HYDROCHLORIDE 0.5 MILLIGRAM(S): 2 INJECTION INTRAMUSCULAR; INTRAVENOUS; SUBCUTANEOUS at 05:45

## 2023-06-05 NOTE — CONSULT NOTE ADULT - NS ATTEND AMEND GEN_ALL_CORE FT
hisotry of HOCM./ on verapamil. intolerance to beta-blocker . diarrhea.   came with mecahnical fall from ladder slippage.    no family history of sudden cardiac death.    right leg fracture and anemia edema. hemorrhagic anemia.  patient not able to tolerate verapamil due to low BP. received 2 units of blood .   discussed with trauma team.  patient need to be optimized with maintenance of good systolic pressure and heart rate of 60-70.    Will get another unit of PRBC.  also ordered albumin.  consider transferring him to iCU for phenyleprhine.  avoid tachy causing pressors such as leveophed and epi , etc.      Not able to tolerate Beta blockers.  however. transiently need to give him some esmolol drip trial . should be ok. no allergy, just intolerance.  need to manage his pressor  systolic > 120.  and heart rate goal <70.   to make sure no significnat obstruction. keep volume up.  ct verapamil 120 Mg BID and may need midodrine transiently to increase BP.   need to keep him in ICU after procedure for 24 hrs post operative.     at home he was taking Triamterne and HCTz  which we will discontinue .    I discussed with the trauma team and Nurse. hisotry of HOCM./ on verapamil. intolerance to beta-blocker . diarrhea.   came with mecahnical fall from ladder slippage.    significnat murmur HCM murmur and MR murmur.  and also murmur radiating to carotids.  likely HCM murmur.  no family history of sudden cardiac death.    right leg fracture and anemia edema. hemorrhagic anemia.  patient not able to tolerate verapamil due to low BP. received 2 units of blood .   discussed with trauma team.  patient need to be optimized with maintenance of good systolic pressure and heart rate of 60-70.    Will get another unit of PRBC.  also ordered albumin.  consider transferring him to iCU for phenyleprhine.  avoid tachy causing pressors such as leveophed and epi , etc.      Not able to tolerate Beta blockers.  however. transiently need to give him some esmolol drip trial . should be ok. no allergy, just intolerance.  need to manage his pressor  systolic > 120.  and heart rate goal <70.   to make sure no significnat obstruction. keep volume up.  ct verapamil 120 Mg BID and may need midodrine transiently to increase BP.   need to keep him in ICU after procedure for 24 hrs post operative.     at home he was taking Triamterne and HCTz  which we will discontinue .    I discussed with the trauma team and Nurse.

## 2023-06-05 NOTE — CONSULT NOTE ADULT - ASSESSMENT
68M PMH CAD (CCTA LAD 50%, OM 40%), HTN, HLD, HOCM, ?pAF presenting s/p fall from 10ft on ladder, right femur fx. Cardiology called for pre surgical evaluation. Denies chest pain, shortness of breath, dyspnea on exertion, orthopnea, dizziness, palpitations. Procedure cancelled today secondary to drop in HH. hbg 11 to 6.

## 2023-06-05 NOTE — CONSULT NOTE ADULT - PROBLEM SELECTOR RECOMMENDATION 9
.  - AKILAH 3/29/2023 ([facility])  LV hyperdynamic, mild AR, MR holosystolic, eccentric and anteriorly directed. JUSTINA of the anterior MV and there is prolapse of the posterior MV leaflet. By PISA EROA is 0.30 cm2 and RVol is 44ml/beat. However PISA likely under-estimates the severity due to eccentric jet. Visually MR appears moderate-severe. Trace TR, trace VA. no PRO. mild atherosclerosis in aortic arch.   - cont verapamil. (pt did not tolerate metoprolol as out patient- GI upset)  - euvolemic on exam  - pt is to establish care with Dr. Poe and Dr. Mejia, previous followed by [facility]  - drop in HH today, 2 units PRBCs infusing, monitor HH, avoid hypotension, hypovolemia. .  - AKILAH 3/29/2023 ([facility])  LV hyperdynamic, mild AR, MR holosystolic, eccentric and anteriorly directed. JUSTINA of the anterior MV and there is prolapse of the posterior MV leaflet. By PISA EROA is 0.30 cm2 and RVol is 44ml/beat. However PISA likely under-estimates the severity due to eccentric jet. Visually MR appears moderate-severe. Trace TR, trace ND. no PRO. mild atherosclerosis in aortic arch.   - cont verapamil. (pt did not tolerate metoprolol as out patient- GI upset)  - euvolemic on exam  - pt is to establish care with Dr. Poe and Dr. Mejia, previous followed by [facility]  - drop in HH today, 2 units PRBCs infusing, monitor HH, avoid hypotension, hypovolemia.   - if HH continues to drop after transfusion can consider CT pelvis, ?pelvic injury vs venus plexis injury.

## 2023-06-05 NOTE — PROGRESS NOTE ADULT - ASSESSMENT
68M PMH BPH, HOCM, CAD, emphysema s/p fall from 10ft ladder with RLE comminuted distal femur fracture. Pending OR with ortho, cancelled yesterday due to patient eating.     - NPO/IVF  - OR today with Ortho  - PT/OT after OR  - Tertiary   - multimodal pain control   - continue home meds
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
## 2023-06-05 NOTE — CONSULT NOTE ADULT - PROBLEM SELECTOR RECOMMENDATION 2
.  - Pre-operative cardiovascular risk evaluation and management.   No cardiac symptoms. Non-ischemic ECG- unchanged.   TTE as above- HOCM.   Ischemic - CCTA non obstructive disease.   RCRI (revised cardiac risk index score) < 1%.   No further cardiac work up is needed.  Further cardiac work up will not change risk of the patient. Proceed for surgery as indicated.  - treat hypotension with fluids, avoid levophed. Recommend use norsynephrine for intraoperative hypotension, or fluid resuscitation

## 2023-06-05 NOTE — PROGRESS NOTE ADULT - SUBJECTIVE AND OBJECTIVE BOX
Ortho Preop Note    Patient is a 68y old  Male who presents with a chief complaint of RLE distal femur fracture (05 Jun 2023 03:58). Pain controlled. No new complaints.    Diagnosis: Right distal femur fx  Procedure: ORIF right distal femur  Surgeon: Dr Mohamud                          6.2    14.87 )-----------( 116      ( 05 Jun 2023 06:23 )             18.8     06-05    134<L>  |  100  |  41.9<H>  ----------------------------<  144<H>  4.2   |  26.0  |  1.43<H>    Ca    7.6<L>      05 Jun 2023 05:00  Phos  2.5     06-05  Mg     2.0     06-05    TPro  6.6  /  Alb  3.9  /  TBili  0.5  /  DBili  x   /  AST  22  /  ALT  16  /  AlkPhos  67  06-03    PT/INR - ( 03 Jun 2023 14:35 )   PT: 12.4 sec;   INR: 1.07 ratio         PTT - ( 03 Jun 2023 14:35 )  PTT:27.9 sec    Vital Signs Last 24 Hrs  T(C): 37.5 (05 Jun 2023 05:02), Max: 37.7 (04 Jun 2023 16:17)  T(F): 99.5 (05 Jun 2023 05:02), Max: 99.8 (04 Jun 2023 16:17)  HR: 105 (05 Jun 2023 05:02) (91 - 105)  BP: 110/69 (05 Jun 2023 05:02) (110/69 - 125/75)  BP(mean): --  RR: 18 (05 Jun 2023 05:02) (18 - 18)  SpO2: 93% (05 Jun 2023 05:02) (93% - 94%)    Parameters below as of 05 Jun 2023 05:02  Patient On (Oxygen Delivery Method): room air  Lying in bed in NAD, awake and alert  Right lower extremity- Knee immobilizer- adjusted. +swelling knee. Skin intact. Compartments soft and compressible. Calf soft, NT. +DF/PF. +EHL/FHL. DP 2+. Sensation intact. Brisk cap refill.       Assessment & Plan:  68yMale with right distal femur fx  -For OR today  -anemia- transfusing 2 units  -pain control  -NPO  -knee immobilizer  -RANJEET BRANDT
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
## 2023-06-05 NOTE — CHART NOTE - NSCHARTNOTEFT_GEN_A_CORE
Tertiary Trauma Survey (TTS)    Date of TTS: 06-05-23 @ 12:45                             Admit Date: 06-03-23 @ 19:09      Trauma Activation: Consult    List Injuries Identified to Date:  -Comminuted R distal Femur fracture        List Operative and Interventional Radiological Procedures:   -Pending OR for ORIF        Constitutional: Well-developed well nourished Male in no acute distress  HEENT: Head is normocephalic and atraumatic, maxillofacial structures stable, no chavarria sign / raccoon eyes,   Neck: trachea midline  Respiratory: Breath sounds CTA b/l respirations are unlabored, no accessory muscle use, no conversational dyspnea  Cardiovascular: Chest wall is non-tender to palpation, no subQ emphysema or crepitus palpated  Gastrointestinal: Abdomen soft, non-tender, non-distended, no rebound tenderness / guarding, no ecchymosis or external signs of abdominal trauma  Musculoskeletal: RLE in splint, Swollen R thigh with soft compartments, moving toes and sensing normally, LLE with skin abrasion to knee and ecchymosis to shin  Pelvis: stable  Vascular: 2+ radial, femoral, and DP pulses BILATERAL  Neurological: GCS: 15 (4/5/6). A&O x 3; no gross sensory / motor / coordination deficits  Neurospinal: no C/T/L/S spine tenderness to palpation, no step-offs or signs of external trauma to the back      RADIOLOGICAL FINDINGS REVIEW:  < from: Xray Knee 3 Views, Right (06.03.23 @ 14:05) >    IMPRESSION: Comminuted fracture lower right femur. Fairly advanced right   knee degeneration.    < end of copied text >    < from: Xray Pelvis AP only (06.03.23 @ 16:46) >    Heart size normal.    Lungs are clear but under circulation. No obvious fracture.    IMPRESSION: Dehydration. Otherwise no acute finding.    < end of copied text >    < from: CT Knee No Cont, Right (06.03.23 @ 18:09) >    Markedly comminuted and displaced fracture of the distal   femur with articular extension as described above.    Nondisplaced and mildly comminuted primarily longitudinal fracture of the   lateral aspect of the patella.    Fractured osteophyte from the anterolateral aspect of the proximal tibia.    Underlying tricompartmental arthrosis with multiple loose bodies in the   joint.    < end of copied text >          INCIDENTAL FINDINGS:    [x ] No    [ ] Yes, Findings are:        [x ] Tertiary exam complete, there are no new injuries identified    [ ] Tertiary exam done, new injuries identified are:                [ ] Imaging ordered:

## 2023-06-05 NOTE — PROGRESS NOTE ADULT - NS ATTEND AMEND GEN_ALL_CORE FT
Patient with critically low H/H and at this time case cancelled as patient not optimized for extensive orthopedic surgery with expected acute blood loss.  Cardiology consult requested and possible FFP and platelets for optimization.  Likely coagulopathic from bleeding femur fracture,. Patient with critically low H/H and at this time case cancelled as patient not optimized for extensive orthopedic surgery with expected acute blood loss.  Cardiology consult requested and possible FFP and platelets for optimization.  Likely coagulopathic from bleeding femur fracture,.    Orthopaedic Trauma Surgeon Addendum:    I have reviewed the physician assistant note and agree with the history, exam, and plan of care, except as noted.    Orthopedic Surgery is ready to proceed with surgery pending medical optimization and adequate Operating Room availability. Risks of surgical delay discussed with other providers and staff. Please call with any questions or concerns.     Pepe Rod MD  Orthopaedic Trauma Surgeon  [facility]

## 2023-06-06 ENCOUNTER — TRANSCRIPTION ENCOUNTER (OUTPATIENT)
Age: 68
End: 2023-06-06

## 2023-06-06 LAB
ANION GAP SERPL CALC-SCNC: 10 MMOL/L — SIGNIFICANT CHANGE UP (ref 5–17)
ANION GAP SERPL CALC-SCNC: 9 MMOL/L — SIGNIFICANT CHANGE UP (ref 5–17)
ANISOCYTOSIS BLD QL: SLIGHT — SIGNIFICANT CHANGE UP
ANISOCYTOSIS BLD QL: SLIGHT — SIGNIFICANT CHANGE UP
BASOPHILS # BLD AUTO: 0 K/UL — SIGNIFICANT CHANGE UP (ref 0–0.2)
BASOPHILS # BLD AUTO: 0 K/UL — SIGNIFICANT CHANGE UP (ref 0–0.2)
BASOPHILS NFR BLD AUTO: 0 % — SIGNIFICANT CHANGE UP (ref 0–2)
BASOPHILS NFR BLD AUTO: 0 % — SIGNIFICANT CHANGE UP (ref 0–2)
BUN SERPL-MCNC: 14.4 MG/DL — SIGNIFICANT CHANGE UP (ref 8–20)
BUN SERPL-MCNC: 18.9 MG/DL — SIGNIFICANT CHANGE UP (ref 8–20)
CALCIUM SERPL-MCNC: 7.6 MG/DL — LOW (ref 8.4–10.5)
CALCIUM SERPL-MCNC: 7.9 MG/DL — LOW (ref 8.4–10.5)
CHLORIDE SERPL-SCNC: 104 MMOL/L — SIGNIFICANT CHANGE UP (ref 96–108)
CHLORIDE SERPL-SCNC: 105 MMOL/L — SIGNIFICANT CHANGE UP (ref 96–108)
CO2 SERPL-SCNC: 22 MMOL/L — SIGNIFICANT CHANGE UP (ref 22–29)
CO2 SERPL-SCNC: 24 MMOL/L — SIGNIFICANT CHANGE UP (ref 22–29)
CREAT SERPL-MCNC: 0.69 MG/DL — SIGNIFICANT CHANGE UP (ref 0.5–1.3)
CREAT SERPL-MCNC: 0.69 MG/DL — SIGNIFICANT CHANGE UP (ref 0.5–1.3)
EGFR: 100 ML/MIN/1.73M2 — SIGNIFICANT CHANGE UP
EGFR: 100 ML/MIN/1.73M2 — SIGNIFICANT CHANGE UP
EOSINOPHIL # BLD AUTO: 0 K/UL — SIGNIFICANT CHANGE UP (ref 0–0.5)
EOSINOPHIL # BLD AUTO: 0.11 K/UL — SIGNIFICANT CHANGE UP (ref 0–0.5)
EOSINOPHIL NFR BLD AUTO: 0 % — SIGNIFICANT CHANGE UP (ref 0–6)
EOSINOPHIL NFR BLD AUTO: 0.9 % — SIGNIFICANT CHANGE UP (ref 0–6)
FIBRINOGEN PPP-MCNC: 539 MG/DL — HIGH (ref 200–450)
GIANT PLATELETS BLD QL SMEAR: PRESENT — SIGNIFICANT CHANGE UP
GIANT PLATELETS BLD QL SMEAR: PRESENT — SIGNIFICANT CHANGE UP
GLUCOSE SERPL-MCNC: 106 MG/DL — HIGH (ref 70–99)
GLUCOSE SERPL-MCNC: 130 MG/DL — HIGH (ref 70–99)
HCT VFR BLD CALC: 23.1 % — LOW (ref 39–50)
HCT VFR BLD CALC: 24.9 % — LOW (ref 39–50)
HCT VFR BLD CALC: 27 % — LOW (ref 39–50)
HGB BLD-MCNC: 7.7 G/DL — LOW (ref 13–17)
HGB BLD-MCNC: 8.5 G/DL — LOW (ref 13–17)
HGB BLD-MCNC: 9.2 G/DL — LOW (ref 13–17)
LYMPHOCYTES # BLD AUTO: 0.38 K/UL — LOW (ref 1–3.3)
LYMPHOCYTES # BLD AUTO: 0.85 K/UL — LOW (ref 1–3.3)
LYMPHOCYTES # BLD AUTO: 2.6 % — LOW (ref 13–44)
LYMPHOCYTES # BLD AUTO: 6.9 % — LOW (ref 13–44)
MAGNESIUM SERPL-MCNC: 1.9 MG/DL — SIGNIFICANT CHANGE UP (ref 1.8–2.6)
MAGNESIUM SERPL-MCNC: 2 MG/DL — SIGNIFICANT CHANGE UP (ref 1.6–2.6)
MANUAL SMEAR VERIFICATION: SIGNIFICANT CHANGE UP
MANUAL SMEAR VERIFICATION: SIGNIFICANT CHANGE UP
MCHC RBC-ENTMCNC: 28.8 PG — SIGNIFICANT CHANGE UP (ref 27–34)
MCHC RBC-ENTMCNC: 28.9 PG — SIGNIFICANT CHANGE UP (ref 27–34)
MCHC RBC-ENTMCNC: 29 PG — SIGNIFICANT CHANGE UP (ref 27–34)
MCHC RBC-ENTMCNC: 33.3 GM/DL — SIGNIFICANT CHANGE UP (ref 32–36)
MCHC RBC-ENTMCNC: 34.1 GM/DL — SIGNIFICANT CHANGE UP (ref 32–36)
MCHC RBC-ENTMCNC: 34.1 GM/DL — SIGNIFICANT CHANGE UP (ref 32–36)
MCV RBC AUTO: 84.9 FL — SIGNIFICANT CHANGE UP (ref 80–100)
MCV RBC AUTO: 85 FL — SIGNIFICANT CHANGE UP (ref 80–100)
MCV RBC AUTO: 86.5 FL — SIGNIFICANT CHANGE UP (ref 80–100)
MICROCYTES BLD QL: SLIGHT — SIGNIFICANT CHANGE UP
MICROCYTES BLD QL: SLIGHT — SIGNIFICANT CHANGE UP
MONOCYTES # BLD AUTO: 0.86 K/UL — SIGNIFICANT CHANGE UP (ref 0–0.9)
MONOCYTES # BLD AUTO: 1.42 K/UL — HIGH (ref 0–0.9)
MONOCYTES NFR BLD AUTO: 7 % — SIGNIFICANT CHANGE UP (ref 2–14)
MONOCYTES NFR BLD AUTO: 9.6 % — SIGNIFICANT CHANGE UP (ref 2–14)
NEUTROPHILS # BLD AUTO: 10.39 K/UL — HIGH (ref 1.8–7.4)
NEUTROPHILS # BLD AUTO: 12.98 K/UL — HIGH (ref 1.8–7.4)
NEUTROPHILS NFR BLD AUTO: 82.6 % — HIGH (ref 43–77)
NEUTROPHILS NFR BLD AUTO: 87.8 % — HIGH (ref 43–77)
NEUTS BAND # BLD: 1.7 % — SIGNIFICANT CHANGE UP (ref 0–8)
NRBC # BLD: 1 /100 — HIGH (ref 0–0)
NRBC # BLD: 2 /100 — HIGH (ref 0–0)
OVALOCYTES BLD QL SMEAR: SLIGHT — SIGNIFICANT CHANGE UP
PHOSPHATE SERPL-MCNC: 1.6 MG/DL — LOW (ref 2.4–4.7)
PHOSPHATE SERPL-MCNC: 2.1 MG/DL — LOW (ref 2.4–4.7)
PLAT MORPH BLD: NORMAL — SIGNIFICANT CHANGE UP
PLAT MORPH BLD: NORMAL — SIGNIFICANT CHANGE UP
PLATELET # BLD AUTO: 117 K/UL — LOW (ref 150–400)
PLATELET # BLD AUTO: 94 K/UL — LOW (ref 150–400)
PLATELET # BLD AUTO: 99 K/UL — LOW (ref 150–400)
POIKILOCYTOSIS BLD QL AUTO: SLIGHT — SIGNIFICANT CHANGE UP
POIKILOCYTOSIS BLD QL AUTO: SLIGHT — SIGNIFICANT CHANGE UP
POLYCHROMASIA BLD QL SMEAR: SIGNIFICANT CHANGE UP
POLYCHROMASIA BLD QL SMEAR: SLIGHT — SIGNIFICANT CHANGE UP
POTASSIUM SERPL-MCNC: 4 MMOL/L — SIGNIFICANT CHANGE UP (ref 3.5–5.3)
POTASSIUM SERPL-MCNC: 4.1 MMOL/L — SIGNIFICANT CHANGE UP (ref 3.5–5.3)
POTASSIUM SERPL-SCNC: 4 MMOL/L — SIGNIFICANT CHANGE UP (ref 3.5–5.3)
POTASSIUM SERPL-SCNC: 4.1 MMOL/L — SIGNIFICANT CHANGE UP (ref 3.5–5.3)
RBC # BLD: 2.67 M/UL — LOW (ref 4.2–5.8)
RBC # BLD: 2.93 M/UL — LOW (ref 4.2–5.8)
RBC # BLD: 3.18 M/UL — LOW (ref 4.2–5.8)
RBC # FLD: 13.8 % — SIGNIFICANT CHANGE UP (ref 10.3–14.5)
RBC # FLD: 14.2 % — SIGNIFICANT CHANGE UP (ref 10.3–14.5)
RBC # FLD: 14.2 % — SIGNIFICANT CHANGE UP (ref 10.3–14.5)
RBC BLD AUTO: ABNORMAL
RBC BLD AUTO: ABNORMAL
SMUDGE CELLS # BLD: PRESENT — SIGNIFICANT CHANGE UP
SODIUM SERPL-SCNC: 136 MMOL/L — SIGNIFICANT CHANGE UP (ref 135–145)
SODIUM SERPL-SCNC: 137 MMOL/L — SIGNIFICANT CHANGE UP (ref 135–145)
TROPONIN T SERPL-MCNC: 0.16 NG/ML — HIGH (ref 0–0.06)
TROPONIN T SERPL-MCNC: 0.18 NG/ML — HIGH (ref 0–0.06)
VARIANT LYMPHS # BLD: 0.9 % — SIGNIFICANT CHANGE UP (ref 0–6)
WBC # BLD: 11.26 K/UL — HIGH (ref 3.8–10.5)
WBC # BLD: 12.33 K/UL — HIGH (ref 3.8–10.5)
WBC # BLD: 14.78 K/UL — HIGH (ref 3.8–10.5)
WBC # FLD AUTO: 11.26 K/UL — HIGH (ref 3.8–10.5)
WBC # FLD AUTO: 12.33 K/UL — HIGH (ref 3.8–10.5)
WBC # FLD AUTO: 14.78 K/UL — HIGH (ref 3.8–10.5)

## 2023-06-06 PROCEDURE — 99234 HOSP IP/OBS SM DT SF/LOW 45: CPT

## 2023-06-06 PROCEDURE — 27513 TREATMENT OF THIGH FRACTURE: CPT | Mod: RT

## 2023-06-06 PROCEDURE — 99291 CRITICAL CARE FIRST HOUR: CPT

## 2023-06-06 PROCEDURE — 27506 TREATMENT OF THIGH FRACTURE: CPT | Mod: RT

## 2023-06-06 PROCEDURE — 75635 CT ANGIO ABDOMINAL ARTERIES: CPT | Mod: 26

## 2023-06-06 PROCEDURE — 71045 X-RAY EXAM CHEST 1 VIEW: CPT | Mod: 26

## 2023-06-06 DEVICE — KWIRE TRC PT 2X150MM: Type: IMPLANTABLE DEVICE | Status: FUNCTIONAL

## 2023-06-06 DEVICE — IMPLANTABLE DEVICE: Type: IMPLANTABLE DEVICE | Status: FUNCTIONAL

## 2023-06-06 DEVICE — SCREW LOKG SLFTP VAR ANG 3.5X87MM: Type: IMPLANTABLE DEVICE | Status: FUNCTIONAL

## 2023-06-06 DEVICE — SCREW LOCKING TI 5X36X25MM STRL: Type: IMPLANTABLE DEVICE | Status: FUNCTIONAL

## 2023-06-06 RX ORDER — VERAPAMIL HCL 240 MG
120 CAPSULE, EXTENDED RELEASE PELLETS 24 HR ORAL EVERY 12 HOURS
Refills: 0 | Status: DISCONTINUED | OUTPATIENT
Start: 2023-06-06 | End: 2023-06-06

## 2023-06-06 RX ORDER — PHENYLEPHRINE HYDROCHLORIDE 10 MG/ML
0.3 INJECTION INTRAVENOUS
Qty: 40 | Refills: 0 | Status: DISCONTINUED | OUTPATIENT
Start: 2023-06-06 | End: 2023-06-06

## 2023-06-06 RX ORDER — ENOXAPARIN SODIUM 100 MG/ML
40 INJECTION SUBCUTANEOUS EVERY 24 HOURS
Refills: 0 | Status: DISCONTINUED | OUTPATIENT
Start: 2023-06-06 | End: 2023-06-06

## 2023-06-06 RX ORDER — HYDROMORPHONE HYDROCHLORIDE 2 MG/ML
0.5 INJECTION INTRAMUSCULAR; INTRAVENOUS; SUBCUTANEOUS EVERY 4 HOURS
Refills: 0 | Status: DISCONTINUED | OUTPATIENT
Start: 2023-06-06 | End: 2023-06-08

## 2023-06-06 RX ORDER — CHLORHEXIDINE GLUCONATE 213 G/1000ML
1 SOLUTION TOPICAL DAILY
Refills: 0 | Status: DISCONTINUED | OUTPATIENT
Start: 2023-06-06 | End: 2023-06-08

## 2023-06-06 RX ORDER — SODIUM CHLORIDE 9 MG/ML
1000 INJECTION, SOLUTION INTRAVENOUS
Refills: 0 | Status: DISCONTINUED | OUTPATIENT
Start: 2023-06-06 | End: 2023-06-06

## 2023-06-06 RX ORDER — CHLORHEXIDINE GLUCONATE 213 G/1000ML
1 SOLUTION TOPICAL
Refills: 0 | Status: DISCONTINUED | OUTPATIENT
Start: 2023-06-06 | End: 2023-06-06

## 2023-06-06 RX ORDER — MIDODRINE HYDROCHLORIDE 2.5 MG/1
10 TABLET ORAL THREE TIMES A DAY
Refills: 0 | Status: DISCONTINUED | OUTPATIENT
Start: 2023-06-06 | End: 2023-06-06

## 2023-06-06 RX ORDER — OXYCODONE HYDROCHLORIDE 5 MG/1
5 TABLET ORAL EVERY 6 HOURS
Refills: 0 | Status: DISCONTINUED | OUTPATIENT
Start: 2023-06-06 | End: 2023-06-08

## 2023-06-06 RX ORDER — FINASTERIDE 5 MG/1
5 TABLET, FILM COATED ORAL EVERY 24 HOURS
Refills: 0 | Status: DISCONTINUED | OUTPATIENT
Start: 2023-06-06 | End: 2023-06-12

## 2023-06-06 RX ORDER — MIDODRINE HYDROCHLORIDE 2.5 MG/1
5 TABLET ORAL EVERY 8 HOURS
Refills: 0 | Status: COMPLETED | OUTPATIENT
Start: 2023-06-06 | End: 2023-06-07

## 2023-06-06 RX ORDER — ACETAMINOPHEN 500 MG
1000 TABLET ORAL ONCE
Refills: 0 | Status: COMPLETED | OUTPATIENT
Start: 2023-06-06 | End: 2023-06-06

## 2023-06-06 RX ORDER — ALBUMIN HUMAN 25 %
250 VIAL (ML) INTRAVENOUS ONCE
Refills: 0 | Status: COMPLETED | OUTPATIENT
Start: 2023-06-06 | End: 2023-06-06

## 2023-06-06 RX ORDER — CHLORHEXIDINE GLUCONATE 213 G/1000ML
1 SOLUTION TOPICAL DAILY
Refills: 0 | Status: DISCONTINUED | OUTPATIENT
Start: 2023-06-06 | End: 2023-06-06

## 2023-06-06 RX ORDER — KETOROLAC TROMETHAMINE 30 MG/ML
15 SYRINGE (ML) INJECTION ONCE
Refills: 0 | Status: DISCONTINUED | OUTPATIENT
Start: 2023-06-06 | End: 2023-06-06

## 2023-06-06 RX ORDER — VERAPAMIL HCL 240 MG
120 CAPSULE, EXTENDED RELEASE PELLETS 24 HR ORAL EVERY 12 HOURS
Refills: 0 | Status: DISCONTINUED | OUTPATIENT
Start: 2023-06-06 | End: 2023-06-07

## 2023-06-06 RX ORDER — CEFAZOLIN SODIUM 1 G
2000 VIAL (EA) INJECTION
Refills: 0 | Status: COMPLETED | OUTPATIENT
Start: 2023-06-06 | End: 2023-06-07

## 2023-06-06 RX ORDER — OXYCODONE HYDROCHLORIDE 5 MG/1
10 TABLET ORAL EVERY 6 HOURS
Refills: 0 | Status: DISCONTINUED | OUTPATIENT
Start: 2023-06-06 | End: 2023-06-08

## 2023-06-06 RX ORDER — ENOXAPARIN SODIUM 100 MG/ML
40 INJECTION SUBCUTANEOUS EVERY 24 HOURS
Refills: 0 | Status: DISCONTINUED | OUTPATIENT
Start: 2023-06-07 | End: 2023-06-12

## 2023-06-06 RX ORDER — LIDOCAINE 4 G/100G
1 CREAM TOPICAL DAILY
Refills: 0 | Status: DISCONTINUED | OUTPATIENT
Start: 2023-06-06 | End: 2023-06-07

## 2023-06-06 RX ORDER — PHENYLEPHRINE HYDROCHLORIDE 10 MG/ML
0.1 INJECTION INTRAVENOUS
Qty: 40 | Refills: 0 | Status: DISCONTINUED | OUTPATIENT
Start: 2023-06-06 | End: 2023-06-08

## 2023-06-06 RX ORDER — TRANEXAMIC ACID 100 MG/ML
1000 INJECTION, SOLUTION INTRAVENOUS ONCE
Refills: 0 | Status: COMPLETED | OUTPATIENT
Start: 2023-06-06 | End: 2023-06-06

## 2023-06-06 RX ORDER — ACETAMINOPHEN 500 MG
975 TABLET ORAL EVERY 6 HOURS
Refills: 0 | Status: DISCONTINUED | OUTPATIENT
Start: 2023-06-06 | End: 2023-06-08

## 2023-06-06 RX ADMIN — Medication 15 MILLIGRAM(S): at 16:25

## 2023-06-06 RX ADMIN — SODIUM CHLORIDE 100 MILLILITER(S): 9 INJECTION INTRAMUSCULAR; INTRAVENOUS; SUBCUTANEOUS at 00:53

## 2023-06-06 RX ADMIN — Medication 85 MILLIMOLE(S): at 08:31

## 2023-06-06 RX ADMIN — Medication 120 MILLIGRAM(S): at 18:38

## 2023-06-06 RX ADMIN — Medication 85 MILLIMOLE(S): at 21:56

## 2023-06-06 RX ADMIN — OXYCODONE HYDROCHLORIDE 10 MILLIGRAM(S): 5 TABLET ORAL at 17:40

## 2023-06-06 RX ADMIN — HYDROMORPHONE HYDROCHLORIDE 0.5 MILLIGRAM(S): 2 INJECTION INTRAMUSCULAR; INTRAVENOUS; SUBCUTANEOUS at 03:55

## 2023-06-06 RX ADMIN — Medication 125 MILLILITER(S): at 04:49

## 2023-06-06 RX ADMIN — CHLORHEXIDINE GLUCONATE 1 APPLICATION(S): 213 SOLUTION TOPICAL at 06:04

## 2023-06-06 RX ADMIN — OXYCODONE HYDROCHLORIDE 10 MILLIGRAM(S): 5 TABLET ORAL at 18:10

## 2023-06-06 RX ADMIN — MIDODRINE HYDROCHLORIDE 5 MILLIGRAM(S): 2.5 TABLET ORAL at 21:15

## 2023-06-06 RX ADMIN — Medication 2000 MILLIGRAM(S): at 21:15

## 2023-06-06 RX ADMIN — MIDODRINE HYDROCHLORIDE 10 MILLIGRAM(S): 2.5 TABLET ORAL at 01:49

## 2023-06-06 RX ADMIN — PHENYLEPHRINE HYDROCHLORIDE 3.2 MICROGRAM(S)/KG/MIN: 10 INJECTION INTRAVENOUS at 23:41

## 2023-06-06 RX ADMIN — MUPIROCIN 1 APPLICATION(S): 20 OINTMENT TOPICAL at 06:07

## 2023-06-06 RX ADMIN — SODIUM CHLORIDE 100 MILLILITER(S): 9 INJECTION INTRAMUSCULAR; INTRAVENOUS; SUBCUTANEOUS at 03:53

## 2023-06-06 RX ADMIN — TRANEXAMIC ACID 220 MILLIGRAM(S): 100 INJECTION, SOLUTION INTRAVENOUS at 06:52

## 2023-06-06 RX ADMIN — CHLORHEXIDINE GLUCONATE 1 APPLICATION(S): 213 SOLUTION TOPICAL at 18:35

## 2023-06-06 RX ADMIN — MIDODRINE HYDROCHLORIDE 10 MILLIGRAM(S): 2.5 TABLET ORAL at 06:05

## 2023-06-06 RX ADMIN — Medication 400 MILLIGRAM(S): at 16:24

## 2023-06-06 RX ADMIN — FINASTERIDE 5 MILLIGRAM(S): 5 TABLET, FILM COATED ORAL at 06:05

## 2023-06-06 RX ADMIN — HYDROMORPHONE HYDROCHLORIDE 0.5 MILLIGRAM(S): 2 INJECTION INTRAMUSCULAR; INTRAVENOUS; SUBCUTANEOUS at 03:40

## 2023-06-06 RX ADMIN — Medication 120 MILLIGRAM(S): at 06:08
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
## 2023-06-06 NOTE — PROGRESS NOTE ADULT - SUBJECTIVE AND OBJECTIVE BOX
ORTHOPEDIC POST-OP PROGRESS NOTE:    Name: CATHY LEVI    MR #: [id number]    Procedure: ORIF distal femur  Surgeon: Dr. Mohamud  DOS: 6/6/23      Pt comfortable without complaints, pain controlled. Denies CP, SOB, N/V, numbness/tingling         Vital Signs Last 24 Hrs  T(C): 37.2 (06-06-23 @ 19:15), Max: 37.2 (06-06-23 @ 19:15)  T(F): 98.9 (06-06-23 @ 19:15), Max: 98.9 (06-06-23 @ 19:15)  HR: 74 (06-06-23 @ 22:30) (64 - 93)  BP: 104/85 (06-06-23 @ 22:30) (64/51 - 131/78)  BP(mean): 93 (06-06-23 @ 22:30) (56 - 93)  RR: 18 (06-06-23 @ 22:30) (10 - 20)  SpO2: 99% (06-06-23 @ 22:30) (90% - 99%)      General Exam:    General: Pt Alert and oriented, NAD, controlled pain.    Dressings: C/D/I. No bleeding. No erythema.    Pulses: + dorsalis pedis pulse. Cap refill < 2 sec.    Sensation: Grossly intact to light touch without deficit.    Motor: +EHL/FHL/AT/GC        A/P: 68y Male  s/p ORIF right distal femur    - Pain Control  - DVT ppx as prescribed: Lovenox 40qd  - PT eval  - Weight bearing status: WBAT RLE

## 2023-06-06 NOTE — PROGRESS NOTE ADULT - SUBJECTIVE AND OBJECTIVE BOX
[facility]                                                         CARDIOLOGY AT [facility]                                                                  [address], [place] [address]                                                         Telephone: [phone]. Fax:[phone]                                                                             PROGRESS NOTE    Reason for follow up: RCRI, HOCM (not new)  Update: no issues      Review of symptoms:   Cardiac:  No chest pain. No dyspnea. No palpitations.  Respiratory: no cough. No dyspnea  Gastrointestinal: No diarrhea. No abdominal pain. No bleeding.   Neuro: No focal neuro complaints.    Vitals:  T(C): 37.1 (06-06-23 @ 07:33), Max: 37.8 (06-06-23 @ 04:01)  HR: 87 (06-06-23 @ 09:15) (77 - 108)  BP: 150/78 (06-06-23 @ 09:15) (84/49 - 150/78)  RR: 16 (06-06-23 @ 09:15) (13 - 21)  SpO2: 93% (06-06-23 @ 09:15) (90% - 100%)  Wt(kg): --  I&O's Summary    05 Jun 2023 07:01  -  06 Jun 2023 07:00  --------------------------------------------------------  IN: 2640 mL / OUT: 2050 mL / NET: 590 mL      Weight (kg): 85.3 (06-05 @ 12:46)    PHYSICAL EXAM:  Appearance: Comfortable. No acute distress  HEENT:  Atraumatic. Normocephalic.  Normal oral mucosa  Neurologic: A & O x 3, no gross focal deficits.  Cardiovascular: RRR S1 S2, +murmur, no rubs/gallops. No JVD  Respiratory: Lungs clear to auscultation, unlabored   Gastrointestinal:  Soft, Non-tender, + BS  Lower Extremities: 2+ Peripheral Pulses, No clubbing, cyanosis, + LE right edema  Psychiatry: Patient is calm. No agitation.   Skin: warm and dry.    CURRENT CARDIAC MEDICATIONS:  phenylephrine    Infusion 0.3 MICROgram(s)/kG/Min IV Continuous <Continuous>  verapamil 120 milliGRAM(s) Oral every 12 hours      CURRENT OTHER MEDICATIONS:  ceFAZolin  Injectable. 2000 milliGRAM(s) IV Push once  vancomycin  IVPB 1500 milliGRAM(s) IV Intermittent once  HYDROmorphone  Injectable 0.5 milliGRAM(s) IV Push every 3 hours PRN Severe Pain (7 - 10)  pantoprazole  Injectable 40 milliGRAM(s) IV Push daily  finasteride 5 milliGRAM(s) Oral every 24 hours  aspirin enteric coated 81 milliGRAM(s) Oral daily  chlorhexidine 2% Cloths 1 Application(s) Topical daily  multiple electrolytes Injection Type 1 1000 milliLiter(s) (110 mL/Hr) IV Continuous <Continuous>  mupirocin 2% Ointment 1 Application(s) Both Nostrils two times a day, Stop order after: 5 Days      LABS:	 	                            8.5    12.33 )-----------( 99       ( 06 Jun 2023 06:10 )             24.9     06-06    137  |  105  |  18.9  ----------------------------<  106<H>  4.1   |  24.0  |  0.69    Ca    7.9<L>      06 Jun 2023 06:10  Phos  1.6     06-06  Mg     2.0     06-06    TPro  5.4<L>  /  Alb  3.0<L>  /  TBili  1.2  /  DBili  x   /  AST  18  /  ALT  13  /  AlkPhos  46  06-05    PT/INR/PTT ( 05 Jun 2023 19:55 )                       :                       :      12.4         :       X                     .        .                   .              .           .       1.07        .                                       Lipid Profile:   HgA1c:   TSH:     TELEMETRY: SR      DIAGNOSTIC TESTING:  [ ] Echocardiogram:   < from: TTE Echo Complete w/ Contrast w/ Doppler (06.05.23 @ 20:20) >  PHYSICIAN INTERPRETATION:  Left Ventricle: The left ventricular internal cavity size is normal.  Global LV systolic function was normal. Left ventricular ejection   fraction, by visual estimation, is 60 to 65%. Hypertrophic CMP with LVOT   max gradient 96 mmHg.  Right Ventricle: The right ventricular size is normal. RV systolic   function is normal.  Left Atrium: Severely enlarged left atrium.  Right Atrium: The right atrium is normal in size.  Pericardium: There is no evidence of pericardial effusion. There is a   significant pericardial fat pad present.  Mitral Valve: The mitral valve is degenerative in appearance. There is   mild to moderate mitral annular calcification. Moderate systolic anterior   motion of the anterior leaflet of the mitral valve. Moderate to severe   mitral valve regurgitation is seen. The MR jet is eccentric anteriorly   directed. Prolapse of posterior leaflet of the mitral valve.  Tricuspid Valve: The tricuspid valve is normal in structure. Mild   tricuspid regurgitation is visualized. Estimated pulmonary artery   systolic pressure is 39.2 mmHg assuming a right atrial pressure of 3   mmHg, which is consistent with borderline pulmonary hypertension.  Aortic Valve: The aortic valve is trileaflet. Sclerotic aortic valve with   decreased opening. Mild aortic valve regurgitation is seen.  Pulmonic Valve: The pulmonic valve was not well visualized. Trace   pulmonic valve regurgitation.  Aorta: The aortic root is normal in size and structure.  Pulmonary Artery: The pulmonary artery is of normal size and origin.  Venous: The inferior vena cava was normal sized, with respiratory size   variation greater than 50%.  In comparisonto the previous echocardiogram(s): There are no prior   studies on this patient for comparison purposes.    < end of copied text >

## 2023-06-06 NOTE — PROGRESS NOTE ADULT - NS ATTEND AMEND GEN_ALL_CORE FT
seen with above,    68M history significant for HTN, HLD, nonobstructive CAD (by CCTA with LAD 50%, OM 40%), HOCM with severe MR was following with [facility] with Dr. Hackett and Dr. Antonio, reports intolerant to metoprolol with diarrhea, mechanical fall with ladder at home with R-distal femoral fracture with severe acute blood loss anemia Hb cole 6.2, monitoring in SICU for fluid hydration and vasopressor, planning for OR today  -HR 80s and SBP 120s  -Echo with preserved LV EF with LVOT gradient of 90 mmHg.   -continue maintain adequate fluid hydration for preload, off thiazide diuretic from home  -continue verapamil to keep HR ideal goal 60 to reduce LVOT obstruction, if BP cannot tolerate may need Norpace  -judicious vasopressor support with phenylephrine to keep MAP >65 and SBP >120s  -currently optimized and no cardiac contraindication for the orthopedic procedure.           Wei Poe DO, [facility]  Faculty Non-Invasive Cardiologist  [phone]

## 2023-06-06 NOTE — DISCHARGE NOTE PROVIDER - NSDCCPCAREPLAN_GEN_ALL_CORE_FT
PRINCIPAL DISCHARGE DIAGNOSIS  Diagnosis: Fracture of distal end of right femur  Assessment and Plan of Treatment: The patient will be seen in the office between 2-3 weeks for wound check. Sutures/Staples/Tape will be removed at that time. Patient may shower after post-op day #5. The dressing is to be removed on day 7. IF THE DRESSING BECOMES SOILED BEFORE THE REMOVAL DATE, CHANGE WITH A SIMILAR DRESSING. IF THE DRESSING BECOMES STAINED WITH Discharge, CONTACT THE OFFICE FOR FURTHER DIRECTIONS.  The patient will contact the office if the wound becomes red, has increasing pain, develops bleeding or discharge, an injury occurs, or has other concerns. The patient will continue PT for gait training. The patient will continue LOVENOX for 4 weeks for blood clot prevention. The patient will take OXYCODONE AND TYLENOL for pain control and titrate according to prescription and patient needs. The patient will take Colace while taking oxycodone to prevent narcotic associated constipation.  Additionally, increase water intake (drink at least 8 glasses of water daily) and try adding fiber to the diet by eating fruits, vegetables and foods that are rich in grains. If constipation is experienced, contact the medical/primary care provider to discuss further treatment options. The patient is FULL weight bearing.      SECONDARY DISCHARGE DIAGNOSES  Diagnosis: HOCM (hypertrophic obstructive cardiomyopathy)  Assessment and Plan of Treatment:

## 2023-06-06 NOTE — DIETITIAN INITIAL EVALUATION ADULT - PERTINENT LABORATORY DATA
2018 10:38 06-06    137  |  105  |  18.9  ----------------------------<  106<H>  4.1   |  24.0  |  0.69    Ca    7.9<L>      06 Jun 2023 06:10  Phos  1.6     06-06  Mg     2.0     06-06    TPro  5.4<L>  /  Alb  3.0<L>  /  TBili  1.2  /  DBili  x   /  AST  18  /  ALT  13  /  AlkPhos  46  06-05

## 2023-06-06 NOTE — PROGRESS NOTE ADULT - PROBLEM SELECTOR PLAN 2
-No cardiac symptoms.   -Non-ischemic ECG- unchanged.   -TTE Preserved EF  -Ischemic - CCTA non obstructive disease.   -RCRI (revised cardiac risk index score) < 1%.   -No further cardiac work up is needed.  -Further cardiac work up will not change risk of the patient. Proceed for surgery as indicated.  -Treat hypotension. Avoid levophed. Recommend use norsynephrine for intraoperative hypotension, or fluid resuscitation.

## 2023-06-06 NOTE — DISCHARGE NOTE PROVIDER - HOSPITAL COURSE
HPI:  68M presenting s/p fall from 10ft on ladder. Patient was painting a skylight when the ladder he was standing on buckled and he fell on it, denies HS/LOC. He is unsure how he landed but his RLE hurt immediately and he was not able to bear weight. Otherwise no pain or complaints. (03 Jun 2023 21:37)    Hospital Course:     HPI:  68M presenting s/p fall from 10ft on ladder. Patient was painting a skylight when the ladder he was standing on buckled and he fell on it, denies HS/LOC. He is unsure how he landed but his RLE hurt immediately and he was not able to bear weight. Otherwise no pain or complaints. (03 Jun 2023 21:37)    Hospital Course:  Patient was admitted to the trauma service with a Right distal femur fracture. Patient evaluated by Orthopedics for operative repair. Preoperatively, patient with drop in H/H requiring transfusion of 2 U PRBC and an additional unit thereafter. Cardiology evaluated patient as well in the perioperative period and recommended patient be transferred to the SICU for OR optimization as patient has poor baseline cardiac function. Patient was subsequently optimized for surgery in the SICU following cardiac workup and stability. Patient was briefly on Phenylephrine to increase SBP > 110 in addition to Midodrine. Cardiology slowly reintroduced Metoprolol thereafter with hopes of decreasing HR and improving filling time which was successful. Patient taken to the OR with Ortho for ORIF of femur. Post operatively patient observed in ICU x 24 hours and then transferred to floor where he continued his recovery. Patient was seen and evaluated by PT/OT services and recommended for CAMDEN upon discharge. Patient at time of discharge was tolerating diet, pain controlled, and able to perform expected activities of daily living.   Length of time preparing discharge > 30 minutes HPI:  68M presenting s/p fall from 10ft on ladder. Patient was painting a skylight when the ladder he was standing on buckled and he fell on it, denies HS/LOC. He is unsure how he landed but his RLE hurt immediately and he was not able to bear weight. Otherwise no pain or complaints. (03 Jun 2023 21:37)    Hospital Course:  Patient was admitted to the trauma service with a Right distal femur fracture. Patient evaluated by Orthopedics for operative repair. Preoperatively, patient with drop in H/H requiring transfusion of 2 U PRBC and an additional unit thereafter. Cardiology evaluated patient as well in the perioperative period and recommended patient be transferred to the SICU for OR optimization as patient has poor baseline cardiac function (HOCM). Patient was subsequently optimized for surgery in the SICU following cardiac workup and stability. Patient was briefly on Phenylephrine to increase SBP > 110 in addition to Midodrine. Cardiology slowly reintroduced Metoprolol thereafter with hopes of decreasing HR and improving filling time which was successful. Midodrine, phenyleprhine, and verapamil all able to be dc. Patient taken to the OR with Ortho for ORIF of femur. Post operatively patient observed in ICU x 24 hours and then transferred to floor where he continued his recovery. Patient was seen and evaluated by PT/OT services and recommended for CAMDEN upon discharge. Patient at time of discharge was tolerating diet, pain controlled, and able to perform expected activities of daily living.   Length of time preparing discharge > 30 minutes

## 2023-06-06 NOTE — DIETITIAN INITIAL EVALUATION ADULT - NS FNS DIET ORDER
Diet, Regular:   DASH/TLC {Sodium & Cholesterol Restricted} (DASH) (06-05-23 @ 14:59)  Diet, NPO after Midnight:      NPO Start Date: 05-Jun-2023,   NPO Start Time: 23:59 (06-05-23 @ 14:53)

## 2023-06-06 NOTE — DISCHARGE NOTE PROVIDER - NSDCMRMEDTOKEN_GEN_ALL_CORE_FT
aspirin 81 mg oral capsule: 1 cap(s) orally once a day  finasteride 5 mg oral tablet: 1 tab(s) orally once a day  omeprazole 20 mg oral delayed release capsule: 1 cap(s) orally once a day  rosuvastatin 20 mg oral tablet: 1 tab(s) orally once a day  Tylenol: 650 milligram(s) orally every 6 hours  verapamil 120 mg oral tablet: 1 tab(s) orally 2 times a day   aspirin 81 mg oral capsule: 1 cap(s) orally once a day  finasteride 5 mg oral tablet: 1 tab(s) orally once a day  omeprazole 20 mg oral delayed release capsule: 1 cap(s) orally once a day  polyethylene glycol 3350 oral powder for reconstitution: 17 gram(s) orally once a day  rosuvastatin 20 mg oral tablet: 1 tab(s) orally once a day  senna leaf extract oral tablet: 2 tab(s) orally once a day (at bedtime)  Tylenol: 650 milligram(s) orally every 6 hours  verapamil 120 mg oral tablet: 1 tab(s) orally 2 times a day   aspirin 81 mg oral capsule: 1 cap(s) orally once a day  enoxaparin: 40 milligram(s) subcutaneous once a day  finasteride 5 mg oral tablet: 1 tab(s) orally once a day  metoprolol tartrate 50 mg oral tablet: 1 tab(s) orally 2 times a day  omeprazole 20 mg oral delayed release capsule: 1 cap(s) orally once a day  polyethylene glycol 3350 oral powder for reconstitution: 17 gram(s) orally once a day  rosuvastatin 20 mg oral tablet: 1 tab(s) orally once a day  senna leaf extract oral tablet: 2 tab(s) orally once a day (at bedtime)  Tylenol: 650 milligram(s) orally every 6 hours  verapamil 120 mg oral tablet: 1 tab(s) orally 2 times a day   enoxaparin: 40 milligram(s) subcutaneous once a day  finasteride 5 mg oral tablet: 1 tab(s) orally once a day  metoprolol tartrate 50 mg oral tablet: 1 tab(s) orally 2 times a day  naloxone 4 mg/0.1 mL nasal spray: 4 intranasally  omeprazole 20 mg oral delayed release capsule: 1 cap(s) orally once a day  oxyCODONE 10 mg oral tablet: 1 tab(s) orally every 4 hours As needed Severe Pain (7 - 10)  oxyCODONE 5 mg oral tablet: 1 tab(s) orally every 4 hours As needed Moderate Pain (4 - 6)  polyethylene glycol 3350 oral powder for reconstitution: 17 gram(s) orally once a day  rosuvastatin 20 mg oral tablet: 1 tab(s) orally once a day  senna leaf extract oral tablet: 2 tab(s) orally once a day (at bedtime)  Tylenol: 650 milligram(s) orally every 6 hours  verapamil 120 mg oral tablet: 1 tab(s) orally 2 times a day   enoxaparin: 40 milligram(s) subcutaneous once a day  finasteride 5 mg oral tablet: 1 tab(s) orally once a day  gabapentin 400 mg oral capsule: 1 cap(s) orally once a day (at bedtime)  metoprolol tartrate 50 mg oral tablet: 1 tab(s) orally 2 times a day  naloxone 4 mg/0.1 mL nasal spray: 4 spray intranasally once  naproxen 250 mg oral tablet: 1 tab(s) orally 2 times a day  omeprazole 20 mg oral delayed release capsule: 1 cap(s) orally once a day  oxyCODONE 10 mg oral tablet: 1 tab(s) orally every 4 hours As needed Severe Pain (7 - 10)  oxyCODONE 5 mg oral tablet: 1 tab(s) orally every 4 hours As needed Moderate Pain (4 - 6)  polyethylene glycol 3350 oral powder for reconstitution: 17 gram(s) orally once a day  rosuvastatin 20 mg oral tablet: 1 tab(s) orally once a day  senna leaf extract oral tablet: 2 tab(s) orally once a day (at bedtime)  Tylenol: 650 milligram(s) orally every 6 hours  verapamil 120 mg oral tablet: 1 tab(s) orally 2 times a day

## 2023-06-06 NOTE — PRE-OP CHECKLIST - ALLERGY BAND ON
Pt seen leaving at this time with boyfriend at this time. Pt is alert and oriented times 4. Pt ambulated with slow steady gait out of the ER. Pt states that she will follow up with [facility] at discharge. Pt educated to return to ER if needed for W/D symptoms. Pt states understanding.       Burak Morris RN  08/26/22 1123 done

## 2023-06-06 NOTE — PROVIDER CONTACT NOTE (CRITICAL VALUE NOTIFICATION) - NS PROVIDER READ BACK
yes
yes
Ivermectin Counseling:  Patient instructed to take medication on an empty stomach with a full glass of water.  Patient informed of potential adverse effects including but not limited to nausea, diarrhea, dizziness, itching, and swelling of the extremities or lymph nodes.  The patient verbalized understanding of the proper use and possible adverse effects of ivermectin.  All of the patient's questions and concerns were addressed.

## 2023-06-06 NOTE — PROGRESS NOTE ADULT - ASSESSMENT
68M PMH CAD (CCTA LAD 50%, OM 40%), HTN, HLD, HOCM, ?pAF presenting s/p fall from 10ft on ladder, right femur fx. Cardiology called for pre surgical evaluation and HOCM management pre / post op 18

## 2023-06-06 NOTE — BRIEF OPERATIVE NOTE - NSICDXBRIEFPROCEDURE_GEN_ALL_CORE_FT
PROCEDURES:  Retrograde intramedullary nailing of distal femur 06-Jun-2023 15:30:01 Retrograde IMN R Distal Femur, ORIF R Distal Femur with Lateral Plate Chip Lin
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
## 2023-06-06 NOTE — DIETITIAN INITIAL EVALUATION ADULT - ENTER TO (G/KG)
Patient sleeping in room with lights dimmed  Call bell within reach  Patient appears to be comfortable with no grimacing        Amber, [address] [facility]  06/02/22 6991 1.6

## 2023-06-06 NOTE — DIETITIAN INITIAL EVALUATION ADULT - OTHER INFO
Pt is a 68 year old Male with PMH BPH, HOCM, CAD, emphysema s/p fall from 10ft ladder with RLE comminuted distal femur fracture. Pending OR with ortho, cancelled yesterday due acute blood loss anemia. Patient given a total of 3 units, hemoglobin went from 6.2 to 8.2 after the first 2units transfused. 3rd unit given repeat cbc pending. Patient transferred to the sicu, placed on phenylephrine in order to increase the BP to a systolic >110, in order to get the HR down 60-70 per cardiology for history of HOCM. Pt currently NPO for OR.

## 2023-06-06 NOTE — DIETITIAN INITIAL EVALUATION ADULT - PERTINENT MEDS FT
MEDICATIONS  (STANDING):  aspirin enteric coated 81 milliGRAM(s) Oral daily  ceFAZolin  Injectable. 2000 milliGRAM(s) IV Push once  chlorhexidine 2% Cloths 1 Application(s) Topical daily  finasteride 5 milliGRAM(s) Oral every 24 hours  multiple electrolytes Injection Type 1 1000 milliLiter(s) (110 mL/Hr) IV Continuous <Continuous>  mupirocin 2% Ointment 1 Application(s) Both Nostrils two times a day  pantoprazole  Injectable 40 milliGRAM(s) IV Push daily  phenylephrine    Infusion 0.3 MICROgram(s)/kG/Min (9.6 mL/Hr) IV Continuous <Continuous>  vancomycin  IVPB 1500 milliGRAM(s) IV Intermittent once  verapamil 120 milliGRAM(s) Oral every 12 hours    MEDICATIONS  (PRN):  HYDROmorphone  Injectable 0.5 milliGRAM(s) IV Push every 3 hours PRN Severe Pain (7 - 10)

## 2023-06-06 NOTE — PROGRESS NOTE ADULT - ASSESSMENT
68M PMH BPH, HOCM, CAD, emphysema s/p fall from 10ft ladder with RLE comminuted distal femur fracture. Pending OR with ortho, cancelled yesterday due acute blood loss anemia  - BP needs to be systolic greater than 110 , HR 60-70  - trend hemoglobin >7  - NPO/IVF  - OR today with Ortho?  - PT/OT after OR  - Tertiary   - multimodal pain control   - continue home meds  68M PMH BPH, HOCM, CAD, emphysema s/p fall from 10ft ladder with RLE comminuted distal femur fracture. Pending OR with ortho, cancelled yesterday due acute blood loss anemia. Patient given a total of 3 units, hemoglobin went from 6.2 to 8.2 after the first 2units transfused. 3rd unit given repeat cbc pending. Patient will be transferred to the sicu to be placed on phenylephrine in order to increase the BP to a systolic >110, in order to get the HR down 60-70 per cardiology for history of HOCM    - BP needs to be systolic greater than 110 , HR 60-70  - trend hemoglobin >7  - NPO/IVF  - OR today with Ortho?  - PT/OT after OR  - Tertiary   - multimodal pain control   - continue home meds

## 2023-06-06 NOTE — DISCHARGE NOTE PROVIDER - CARE PROVIDER_API CALL
Pepe Rod  Orthopaedic Surgery  [address] [place] [id number]  Phone: [phone]  Fax: [phone]  Follow Up Time:    Pepe Rod  Orthopaedic Surgery  [address] [place] [id number]  Phone: [phone]  Fax: [phone]  Follow Up Time:     Socrates Sinclair  Pain Medicine  [address] [facility], [place] [address]  Phone: [phone]  Fax: [phone]  Follow Up Time:

## 2023-06-06 NOTE — DISCHARGE NOTE PROVIDER - NSDCCPTREATMENT_GEN_ALL_CORE_FT
PRINCIPAL PROCEDURE  Procedure: Retrograde intramedullary nailing of distal femur  Findings and Treatment: Retrograde IMN R Distal Femur, ORIF R Distal Femur with Lateral Plate      SECONDARY PROCEDURE  Procedure: Retrograde intramedullary nailing of distal femur  Findings and Treatment: Retrograde IMN R Distal Femur, ORIF R Distal Femur with Lateral Plate

## 2023-06-06 NOTE — DISCHARGE NOTE PROVIDER - PROVIDER TOKENS
PROVIDER:[TOKEN:[[id number]]] PROVIDER:[TOKEN:[[id number]]],PROVIDER:[TOKEN:[[id number]]]

## 2023-06-06 NOTE — PROGRESS NOTE ADULT - SUBJECTIVE AND OBJECTIVE BOX
SUBJECTIVE/24 hour events:  Patient is a 68yMale with history of HOCM s/p fall off 10ft ladder sustaining comminuted right femur fracture with subsequent development of acute blood loss anemia. Patient was planned for the OR on 6/5 but cancelled 2/2 drop in hemoglobin to 6.2, given 2 units of PRBC and responded appropriately to 8.2. Per Ortho received an additional 3rd unit. Patient cleared by cards but with specific instructions that the HR needs to be between 6-70 and BP at least greater than 110 2/2 history of HOCM, patient unable to take lopressor because he develops diarrhea, cards wants verapamil 120mg, patient's hr has been in the range of 's and bp systolic 110-93. The plan is to likely transfer patient to the SICU so phenylephrine to be started in order to raise the bp and then administer the verapmil to lower the HR. ECHO completed at bedside.       Vital Signs Last 24 Hrs  T(C): 37.2 (05 Jun 2023 22:30), Max: 37.5 (05 Jun 2023 05:02)  T(F): 99 (05 Jun 2023 22:30), Max: 99.5 (05 Jun 2023 05:02)  HR: 102 (05 Jun 2023 22:30) (91 - 105)  BP: 96/62 (05 Jun 2023 22:30) (84/49 - 110/69)  BP(mean): 70 (05 Jun 2023 14:34) (69 - 70)  RR: 17 (05 Jun 2023 22:30) (16 - 18)  SpO2: 92% (05 Jun 2023 22:30) (92% - 100%)    Parameters below as of 05 Jun 2023 22:30  Patient On (Oxygen Delivery Method): room air      Drug Dosing Weight  Height (cm): 172.7 (05 Jun 2023 12:46)  Weight (kg): 85.3 (05 Jun 2023 12:46)  BMI (kg/m2): 28.6 (05 Jun 2023 12:46)  BSA (m2): 1.99 (05 Jun 2023 12:46)  I&O's Detail    04 Jun 2023 07:01  -  05 Jun 2023 07:00  --------------------------------------------------------  IN:    Oral Fluid: 230 mL    sodium chloride 0.9%: 2000 mL  Total IN: 2230 mL    OUT:    Voided (mL): 250 mL  Total OUT: 250 mL    Total NET: 1980 mL      05 Jun 2023 07:01  -  06 Jun 2023 00:42  --------------------------------------------------------  IN:    PRBCs (Packed Red Blood Cells): 690 mL    sodium chloride 0.9%: 1100 mL  Total IN: 1790 mL    OUT:    Voided (mL): 1550 mL  Total OUT: 1550 mL    Total NET: 240 mL        Allergies    polymyxin B sulfate (Unknown)    Intolerances                              8.2    14.24 )-----------( 110      ( 05 Jun 2023 17:48 )             24.5   06-05    136  |  103  |  27.9<H>  ----------------------------<  127<H>  3.9   |  25.0  |  1.00    Ca    8.0<L>      05 Jun 2023 17:48  Phos  2.5     06-05  Mg     2.0     06-05    TPro  5.4<L>  /  Alb  3.0<L>  /  TBili  1.2  /  DBili  x   /  AST  18  /  ALT  13  /  AlkPhos  46  06-05  PT/INR - ( 05 Jun 2023 19:55 )   PT: 12.4 sec;   INR: 1.07 ratio             ROS:    PHYSICAL EXAM:  Constitutional:  Eyes:  ENMT:  Neck:  Breasts:  Back:  Respiratory:  Cardiovascular:  Gastrointestinal:  Genitourinary:  Rectal:  Extremities:  Vascular:  Neurological:  Skin:  Musculoskeletal:  Psychiatric:        MEDICATIONS  (STANDING):  aspirin enteric coated 81 milliGRAM(s) Oral daily  ceFAZolin  Injectable. 2000 milliGRAM(s) IV Push once  finasteride 5 milliGRAM(s) Oral every 24 hours  mupirocin 2% Ointment 1 Application(s) Both Nostrils two times a day  pantoprazole  Injectable 40 milliGRAM(s) IV Push daily  sodium chloride 0.9%. 1000 milliLiter(s) (100 mL/Hr) IV Continuous <Continuous>  vancomycin  IVPB 1500 milliGRAM(s) IV Intermittent once  verapamil 120 milliGRAM(s) Oral every 12 hours  verapamil  milliGRAM(s) Oral once    MEDICATIONS  (PRN):  HYDROmorphone   Tablet 4 milliGRAM(s) Oral every 3 hours PRN Severe Pain (7 - 10)  HYDROmorphone  Injectable 0.5 milliGRAM(s) IV Push every 3 hours PRN Severe Pain (7 - 10)  ketorolac   Injectable 15 milliGRAM(s) IV Push every 6 hours PRN Mild Pain (1 - 3)      RADIOLOGY STUDIES:    CULTURES:         SUBJECTIVE/24 hour events:  Patient is a 68yMale with history of HOCM s/p fall off 10ft ladder sustaining comminuted right femur fracture with subsequent development of acute blood loss anemia. Patient was planned for the OR on 6/5 but cancelled 2/2 drop in hemoglobin to 6.2, given 2 units of PRBC and responded appropriately to 8.2. Per Ortho received an additional 3rd unit. Patient cleared by cards but with specific instructions that the HR needs to be between 6-70 and BP at least greater than 110 2/2 history of HOCM, patient unable to take lopressor because he develops diarrhea, cards wants verapamil 120mg, patient's hr has been in the range of 's and bp systolic 110-93. The plan is to likely transfer patient to the SICU so phenylephrine to be started in order to raise the bp and then administer the verapmil to lower the HR. ECHO completed at bedside.       Vital Signs Last 24 Hrs  T(C): 37.2 (05 Jun 2023 22:30), Max: 37.5 (05 Jun 2023 05:02)  T(F): 99 (05 Jun 2023 22:30), Max: 99.5 (05 Jun 2023 05:02)  HR: 102 (05 Jun 2023 22:30) (91 - 105)  BP: 96/62 (05 Jun 2023 22:30) (84/49 - 110/69)  BP(mean): 70 (05 Jun 2023 14:34) (69 - 70)  RR: 17 (05 Jun 2023 22:30) (16 - 18)  SpO2: 92% (05 Jun 2023 22:30) (92% - 100%)    Parameters below as of 05 Jun 2023 22:30  Patient On (Oxygen Delivery Method): room air      Drug Dosing Weight  Height (cm): 172.7 (05 Jun 2023 12:46)  Weight (kg): 85.3 (05 Jun 2023 12:46)  BMI (kg/m2): 28.6 (05 Jun 2023 12:46)  BSA (m2): 1.99 (05 Jun 2023 12:46)  I&O's Detail    04 Jun 2023 07:01  -  05 Jun 2023 07:00  --------------------------------------------------------  IN:    Oral Fluid: 230 mL    sodium chloride 0.9%: 2000 mL  Total IN: 2230 mL    OUT:    Voided (mL): 250 mL  Total OUT: 250 mL    Total NET: 1980 mL      05 Jun 2023 07:01  -  06 Jun 2023 00:42  --------------------------------------------------------  IN:    PRBCs (Packed Red Blood Cells): 690 mL    sodium chloride 0.9%: 1100 mL  Total IN: 1790 mL    OUT:    Voided (mL): 1550 mL  Total OUT: 1550 mL    Total NET: 240 mL        Allergies    polymyxin B sulfate (Unknown)    Intolerances                              8.2    14.24 )-----------( 110      ( 05 Jun 2023 17:48 )             24.5   06-05    136  |  103  |  27.9<H>  ----------------------------<  127<H>  3.9   |  25.0  |  1.00    Ca    8.0<L>      05 Jun 2023 17:48  Phos  2.5     06-05  Mg     2.0     06-05    TPro  5.4<L>  /  Alb  3.0<L>  /  TBili  1.2  /  DBili  x   /  AST  18  /  ALT  13  /  AlkPhos  46  06-05  PT/INR - ( 05 Jun 2023 19:55 )   PT: 12.4 sec;   INR: 1.07 ratio             ROS:    PHYSICAL EXAM:  Constitutional: in good spirits   Respiratory: no respiratory distress, no dyspnea, no supplemental o2 needed   Cardiovascular: sinus tachycardia  Gastrointestinal: abdomen soft, non-tender, atraumatic   Genitourinary: voiding  Extremities: RLE NVI, compartments edematous but soft, warm to touch, +2 dp pulse  Neurological: A&OX3          MEDICATIONS  (STANDING):  aspirin enteric coated 81 milliGRAM(s) Oral daily  ceFAZolin  Injectable. 2000 milliGRAM(s) IV Push once  finasteride 5 milliGRAM(s) Oral every 24 hours  mupirocin 2% Ointment 1 Application(s) Both Nostrils two times a day  pantoprazole  Injectable 40 milliGRAM(s) IV Push daily  sodium chloride 0.9%. 1000 milliLiter(s) (100 mL/Hr) IV Continuous <Continuous>  vancomycin  IVPB 1500 milliGRAM(s) IV Intermittent once  verapamil 120 milliGRAM(s) Oral every 12 hours  verapamil  milliGRAM(s) Oral once    MEDICATIONS  (PRN):  HYDROmorphone   Tablet 4 milliGRAM(s) Oral every 3 hours PRN Severe Pain (7 - 10)  HYDROmorphone  Injectable 0.5 milliGRAM(s) IV Push every 3 hours PRN Severe Pain (7 - 10)  ketorolac   Injectable 15 milliGRAM(s) IV Push every 6 hours PRN Mild Pain (1 - 3)      RADIOLOGY STUDIES:    CULTURES:

## 2023-06-06 NOTE — DISCHARGE NOTE PROVIDER - NSDCFUSCHEDAPPT_GEN_ALL_CORE_FT
Wei Poe  [facility]  CARDIOLOGY [address] [facility]  Scheduled Appointment: 06/15/2023
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
## 2023-06-06 NOTE — BRIEF OPERATIVE NOTE - COMMENTS
post-op plan: WBAT, PT/OT, ancef per protocol, contralateral SCD, LMWH (or equivalent) x 4 weeks post-op, f/u ortho 2-3 weeks for wound check

## 2023-06-06 NOTE — DISCHARGE NOTE PROVIDER - NSDCFUADDINST_GEN_ALL_CORE_FT
The patient will be seen in the office between 2-3 weeks for wound check. Sutures/Staples/Tape will be removed at that time. Patient may shower after post-op day #5. The dressing is to be removed on day 7. IF THE DRESSING BECOMES SOILED BEFORE THE REMOVAL DATE, CHANGE WITH A SIMILAR DRESSING. IF THE DRESSING BECOMES STAINED WITH DISCHARGE, CONTACT THE OFFICE FOR FURTHER DIRECTIONS.  The patient will contact the office if the wound becomes red, has increasing pain, develops bleeding or discharge, an injury occurs, or has other concerns. The patient will continue PT for gait training. The patient will continue LOVENOX for 4 weeks for blood clot prevention. The patient will take OXYCODONE AND TYLENOL for pain control and titrate according to prescription and patient needs. The patient will take Colace while taking oxycodone to prevent narcotic associated constipation.  Additionally, increase water intake (drink at least 8 glasses of water daily) and try adding fiber to the diet by eating fruits, vegetables and foods that are rich in grains. If constipation is experienced, contact the medical/primary care provider to discuss further treatment options. The patient is FULL weight bearing.   The patient will be seen in the office between 2-3 weeks for wound check. Sutures/Staples/Tape will be removed at that time. Patient may shower after post-op day #5. The dressing is to be removed on day 7. IF THE DRESSING BECOMES SOILED BEFORE THE REMOVAL DATE, CHANGE WITH A SIMILAR DRESSING. IF THE DRESSING BECOMES STAINED WITH Discharge, CONTACT THE OFFICE FOR FURTHER DIRECTIONS.  The patient will contact the office if the wound becomes red, has increasing pain, develops bleeding or discharge, an injury occurs, or has other concerns. The patient will continue PT for gait training. The patient will continue LOVENOX for 4 weeks for blood clot prevention. The patient will take OXYCODONE AND TYLENOL for pain control and titrate according to prescription and patient needs. The patient will take Colace while taking oxycodone to prevent narcotic associated constipation.  Additionally, increase water intake (drink at least 8 glasses of water daily) and try adding fiber to the diet by eating fruits, vegetables and foods that are rich in grains. If constipation is experienced, contact the medical/primary care provider to discuss further treatment options. The patient is FULL weight bearing.

## 2023-06-06 NOTE — DIETITIAN INITIAL EVALUATION ADULT - ORAL INTAKE PTA/DIET HISTORY
Pt reports eating well PTA; recently started wanting to lose weight so has been eating smaller portions. Pt denies any difficulty chewing or swallowing at this time. Pt is currently NPO for OR.
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
## 2023-06-06 NOTE — PROGRESS NOTE ADULT - ASSESSMENT
68 year old gentleman w/CAD, HTN, HLN, AS and HOCM who was admitted 6/3/23 after falling 10ft off a ladder on 6/3/23.  Found to have a right distal femur fracture.  Transferred to critical care unit 6/6/23 when he was noted to have a significant drop in H/H and mild hypotension.  Improved after transfusion of 3UPRBC.  F/U CT scan abd/pelvis did not show active bleeding    -HD stable.  Appreciate Cardiology input.  Patient w/preserved EF, OK for OR.  Continue to avoid norepinephrine in setting of AS  -H/H improved after transfusions (3 total), serial H/H  -Breathing comfortably on RA  -NPO  -Cr stable, voiding  -SCDs, chemical prophylaxis on hold  -Plan for OR with Orthopedics  -Pain medication

## 2023-06-07 LAB
ANION GAP SERPL CALC-SCNC: 10 MMOL/L — SIGNIFICANT CHANGE UP (ref 5–17)
ANISOCYTOSIS BLD QL: SLIGHT — SIGNIFICANT CHANGE UP
BASOPHILS # BLD AUTO: 0 K/UL — SIGNIFICANT CHANGE UP (ref 0–0.2)
BASOPHILS NFR BLD AUTO: 0 % — SIGNIFICANT CHANGE UP (ref 0–2)
BUN SERPL-MCNC: 17.7 MG/DL — SIGNIFICANT CHANGE UP (ref 8–20)
CALCIUM SERPL-MCNC: 7.4 MG/DL — LOW (ref 8.4–10.5)
CHLORIDE SERPL-SCNC: 102 MMOL/L — SIGNIFICANT CHANGE UP (ref 96–108)
CO2 SERPL-SCNC: 24 MMOL/L — SIGNIFICANT CHANGE UP (ref 22–29)
CREAT SERPL-MCNC: 0.79 MG/DL — SIGNIFICANT CHANGE UP (ref 0.5–1.3)
EGFR: 96 ML/MIN/1.73M2 — SIGNIFICANT CHANGE UP
EOSINOPHIL # BLD AUTO: 0.23 K/UL — SIGNIFICANT CHANGE UP (ref 0–0.5)
EOSINOPHIL NFR BLD AUTO: 1.7 % — SIGNIFICANT CHANGE UP (ref 0–6)
GIANT PLATELETS BLD QL SMEAR: PRESENT — SIGNIFICANT CHANGE UP
GLUCOSE SERPL-MCNC: 114 MG/DL — HIGH (ref 70–99)
HCT VFR BLD CALC: 27.5 % — LOW (ref 39–50)
HGB BLD-MCNC: 9.2 G/DL — LOW (ref 13–17)
LYMPHOCYTES # BLD AUTO: 0.59 K/UL — LOW (ref 1–3.3)
LYMPHOCYTES # BLD AUTO: 4.4 % — LOW (ref 13–44)
MACROCYTES BLD QL: SLIGHT — SIGNIFICANT CHANGE UP
MAGNESIUM SERPL-MCNC: 1.9 MG/DL — SIGNIFICANT CHANGE UP (ref 1.6–2.6)
MANUAL SMEAR VERIFICATION: SIGNIFICANT CHANGE UP
MCHC RBC-ENTMCNC: 29.3 PG — SIGNIFICANT CHANGE UP (ref 27–34)
MCHC RBC-ENTMCNC: 33.5 GM/DL — SIGNIFICANT CHANGE UP (ref 32–36)
MCV RBC AUTO: 87.6 FL — SIGNIFICANT CHANGE UP (ref 80–100)
MICROCYTES BLD QL: SLIGHT — SIGNIFICANT CHANGE UP
MONOCYTES # BLD AUTO: 1.4 K/UL — HIGH (ref 0–0.9)
MONOCYTES NFR BLD AUTO: 10.4 % — SIGNIFICANT CHANGE UP (ref 2–14)
NEUTROPHILS # BLD AUTO: 11.11 K/UL — HIGH (ref 1.8–7.4)
NEUTROPHILS NFR BLD AUTO: 81.7 % — HIGH (ref 43–77)
NEUTS BAND # BLD: 0.9 % — SIGNIFICANT CHANGE UP (ref 0–8)
OVALOCYTES BLD QL SMEAR: SLIGHT — SIGNIFICANT CHANGE UP
PHOSPHATE SERPL-MCNC: 2.5 MG/DL — SIGNIFICANT CHANGE UP (ref 2.4–4.7)
PLAT MORPH BLD: NORMAL — SIGNIFICANT CHANGE UP
PLATELET # BLD AUTO: 132 K/UL — LOW (ref 150–400)
POIKILOCYTOSIS BLD QL AUTO: SLIGHT — SIGNIFICANT CHANGE UP
POLYCHROMASIA BLD QL SMEAR: SIGNIFICANT CHANGE UP
POTASSIUM SERPL-MCNC: 4.2 MMOL/L — SIGNIFICANT CHANGE UP (ref 3.5–5.3)
POTASSIUM SERPL-SCNC: 4.2 MMOL/L — SIGNIFICANT CHANGE UP (ref 3.5–5.3)
RBC # BLD: 3.14 M/UL — LOW (ref 4.2–5.8)
RBC # FLD: 14.6 % — HIGH (ref 10.3–14.5)
RBC BLD AUTO: ABNORMAL
SODIUM SERPL-SCNC: 135 MMOL/L — SIGNIFICANT CHANGE UP (ref 135–145)
TROPONIN T SERPL-MCNC: 0.16 NG/ML — HIGH (ref 0–0.06)
VARIANT LYMPHS # BLD: 0.9 % — SIGNIFICANT CHANGE UP (ref 0–6)
WBC # BLD: 13.45 K/UL — HIGH (ref 3.8–10.5)
WBC # FLD AUTO: 13.45 K/UL — HIGH (ref 3.8–10.5)

## 2023-06-07 PROCEDURE — 93010 ELECTROCARDIOGRAM REPORT: CPT

## 2023-06-07 PROCEDURE — 99291 CRITICAL CARE FIRST HOUR: CPT

## 2023-06-07 PROCEDURE — 99234 HOSP IP/OBS SM DT SF/LOW 45: CPT

## 2023-06-07 RX ORDER — POLYETHYLENE GLYCOL 3350 17 G/17G
17 POWDER, FOR SOLUTION ORAL DAILY
Refills: 0 | Status: DISCONTINUED | OUTPATIENT
Start: 2023-06-07 | End: 2023-06-12

## 2023-06-07 RX ORDER — MIDODRINE HYDROCHLORIDE 2.5 MG/1
5 TABLET ORAL EVERY 8 HOURS
Refills: 0 | Status: DISCONTINUED | OUTPATIENT
Start: 2023-06-07 | End: 2023-06-09

## 2023-06-07 RX ORDER — ACETAMINOPHEN 500 MG
1000 TABLET ORAL ONCE
Refills: 0 | Status: COMPLETED | OUTPATIENT
Start: 2023-06-07 | End: 2023-06-07

## 2023-06-07 RX ORDER — SENNA PLUS 8.6 MG/1
2 TABLET ORAL AT BEDTIME
Refills: 0 | Status: DISCONTINUED | OUTPATIENT
Start: 2023-06-07 | End: 2023-06-12

## 2023-06-07 RX ORDER — MIDODRINE HYDROCHLORIDE 2.5 MG/1
5 TABLET ORAL EVERY 8 HOURS
Refills: 0 | Status: DISCONTINUED | OUTPATIENT
Start: 2023-06-07 | End: 2023-06-07

## 2023-06-07 RX ORDER — DISOPYRAMIDE PHOSPHATE 100 MG
200 CAPSULE ORAL
Refills: 0 | Status: DISCONTINUED | OUTPATIENT
Start: 2023-06-07 | End: 2023-06-07

## 2023-06-07 RX ADMIN — HYDROMORPHONE HYDROCHLORIDE 0.5 MILLIGRAM(S): 2 INJECTION INTRAMUSCULAR; INTRAVENOUS; SUBCUTANEOUS at 07:00

## 2023-06-07 RX ADMIN — OXYCODONE HYDROCHLORIDE 10 MILLIGRAM(S): 5 TABLET ORAL at 02:53

## 2023-06-07 RX ADMIN — OXYCODONE HYDROCHLORIDE 10 MILLIGRAM(S): 5 TABLET ORAL at 17:04

## 2023-06-07 RX ADMIN — OXYCODONE HYDROCHLORIDE 10 MILLIGRAM(S): 5 TABLET ORAL at 11:00

## 2023-06-07 RX ADMIN — Medication 200 MILLIGRAM(S): at 17:22

## 2023-06-07 RX ADMIN — CHLORHEXIDINE GLUCONATE 1 APPLICATION(S): 213 SOLUTION TOPICAL at 11:35

## 2023-06-07 RX ADMIN — HYDROMORPHONE HYDROCHLORIDE 0.5 MILLIGRAM(S): 2 INJECTION INTRAMUSCULAR; INTRAVENOUS; SUBCUTANEOUS at 10:32

## 2023-06-07 RX ADMIN — Medication 2000 MILLIGRAM(S): at 05:20

## 2023-06-07 RX ADMIN — PHENYLEPHRINE HYDROCHLORIDE 3.2 MICROGRAM(S)/KG/MIN: 10 INJECTION INTRAVENOUS at 06:29

## 2023-06-07 RX ADMIN — Medication 400 MILLIGRAM(S): at 12:54

## 2023-06-07 RX ADMIN — HYDROMORPHONE HYDROCHLORIDE 0.5 MILLIGRAM(S): 2 INJECTION INTRAMUSCULAR; INTRAVENOUS; SUBCUTANEOUS at 10:47

## 2023-06-07 RX ADMIN — MIDODRINE HYDROCHLORIDE 5 MILLIGRAM(S): 2.5 TABLET ORAL at 21:43

## 2023-06-07 RX ADMIN — HYDROMORPHONE HYDROCHLORIDE 0.5 MILLIGRAM(S): 2 INJECTION INTRAMUSCULAR; INTRAVENOUS; SUBCUTANEOUS at 06:11

## 2023-06-07 RX ADMIN — FINASTERIDE 5 MILLIGRAM(S): 5 TABLET, FILM COATED ORAL at 05:20

## 2023-06-07 RX ADMIN — OXYCODONE HYDROCHLORIDE 10 MILLIGRAM(S): 5 TABLET ORAL at 10:17

## 2023-06-07 RX ADMIN — OXYCODONE HYDROCHLORIDE 10 MILLIGRAM(S): 5 TABLET ORAL at 22:27

## 2023-06-07 RX ADMIN — SENNA PLUS 2 TABLET(S): 8.6 TABLET ORAL at 21:43

## 2023-06-07 RX ADMIN — HYDROMORPHONE HYDROCHLORIDE 0.5 MILLIGRAM(S): 2 INJECTION INTRAMUSCULAR; INTRAVENOUS; SUBCUTANEOUS at 21:00

## 2023-06-07 RX ADMIN — MIDODRINE HYDROCHLORIDE 5 MILLIGRAM(S): 2.5 TABLET ORAL at 05:20

## 2023-06-07 RX ADMIN — MIDODRINE HYDROCHLORIDE 5 MILLIGRAM(S): 2.5 TABLET ORAL at 14:29

## 2023-06-07 RX ADMIN — HYDROMORPHONE HYDROCHLORIDE 0.5 MILLIGRAM(S): 2 INJECTION INTRAMUSCULAR; INTRAVENOUS; SUBCUTANEOUS at 20:03

## 2023-06-07 RX ADMIN — OXYCODONE HYDROCHLORIDE 10 MILLIGRAM(S): 5 TABLET ORAL at 23:20

## 2023-06-07 RX ADMIN — OXYCODONE HYDROCHLORIDE 10 MILLIGRAM(S): 5 TABLET ORAL at 16:09

## 2023-06-07 RX ADMIN — OXYCODONE HYDROCHLORIDE 10 MILLIGRAM(S): 5 TABLET ORAL at 03:30

## 2023-06-07 RX ADMIN — ENOXAPARIN SODIUM 40 MILLIGRAM(S): 100 INJECTION SUBCUTANEOUS at 05:20

## 2023-06-07 NOTE — PHYSICAL THERAPY INITIAL EVALUATION ADULT - NSPTDISCHREC_GEN_A_CORE
Pt requesting Home d/c, pending improved functional progress. [place] may be appropriate if progress not achieved

## 2023-06-07 NOTE — PROGRESS NOTE ADULT - ASSESSMENT
68M PMH CAD (CCTA LAD 50%, OM 40%), HTN, HLD, HOCM, ?pAF presenting s/p fall from 10ft on ladder, right femur fx. Cardiology called for pre surgical evaluation and HOCM management pre / post op

## 2023-06-07 NOTE — PHYSICAL THERAPY INITIAL EVALUATION ADULT - GENERAL OBSERVATIONS, REHAB EVAL
Pt received in semifowler position with RLE bandage, monitor, IV pleasant and agreeable to PT with 7/10 pain before, and 10/10 pain during treatment

## 2023-06-07 NOTE — PROGRESS NOTE ADULT - SUBJECTIVE AND OBJECTIVE BOX
[facility]                                                         CARDIOLOGY AT [place] [address]                                                         Telephone: [phone]. Fax:[phone]                                                                             PROGRESS NOTE    Reason for follow up: HOCM  Update: Tachycardic and hypotensive       Review of symptoms:   Cardiac:  No chest pain. No dyspnea. No palpitations.  Respiratory: no cough. No dyspnea  Gastrointestinal: No diarrhea. No abdominal pain. No bleeding.   Neuro: No focal neuro complaints.    Vitals:  T(C): 38.1 (06-07-23 @ 11:45), Max: 38.1 (06-07-23 @ 11:45)  HR: 107 (06-07-23 @ 13:00) (64 - 113)  BP: 88/55 (06-07-23 @ 13:00) (64/51 - 158/138)  RR: 15 (06-07-23 @ 13:00) (10 - 25)  SpO2: 93% (06-07-23 @ 13:00) (89% - 100%)  Wt(kg): --  I&O's Summary    06 Jun 2023 07:01  -  07 Jun 2023 07:00  --------------------------------------------------------  IN: 901.7 mL / OUT: 550 mL / NET: 351.7 mL    07 Jun 2023 07:01  -  07 Jun 2023 13:15  --------------------------------------------------------  IN: 0 mL / OUT: 250 mL / NET: -250 mL      Weight (kg): 85.3 (06-05 @ 12:46)    PHYSICAL EXAM:  Appearance: Comfortable. No acute distress  HEENT:  Atraumatic. Normocephalic.  Normal oral mucosa  Neurologic: A & O x 3, no gross focal deficits.  Cardiovascular: RRR S1 S2, No murmur, no rubs/gallops. No JVD  Respiratory: Lungs clear to auscultation, unlabored   Gastrointestinal:  Soft, Non-tender, + BS  Lower Extremities: 2+ Peripheral Pulses, No clubbing, cyanosis, or edema  Psychiatry: Patient is calm. No agitation.   Skin: warm and dry.    CURRENT CARDIAC MEDICATIONS:  midodrine 5 milliGRAM(s) Oral every 8 hours  phenylephrine    Infusion 0.1 MICROgram(s)/kG/Min IV Continuous <Continuous>      CURRENT OTHER MEDICATIONS:  acetaminophen     Tablet .. 975 milliGRAM(s) Oral every 6 hours PRN Mild Pain (1 - 3)  HYDROmorphone  Injectable 0.5 milliGRAM(s) IV Push every 4 hours PRN Breakthrough pain  oxyCODONE    IR 5 milliGRAM(s) Oral every 6 hours PRN Moderate Pain (4 - 6)  oxyCODONE    IR 10 milliGRAM(s) Oral every 6 hours PRN Severe Pain (7 - 10)  polyethylene glycol 3350 17 Gram(s) Oral daily  senna 2 Tablet(s) Oral at bedtime  finasteride 5 milliGRAM(s) Oral every 24 hours  chlorhexidine 2% Cloths 1 Application(s) Topical daily  enoxaparin Injectable 40 milliGRAM(s) SubCutaneous every 24 hours      LABS:	 	  ( 07 Jun 2023 05:45 )  Troponin T  0.16<H>,  CPK  X    , CKMB  X    , BNP X        , ( 06 Jun 2023 21:20 )  Troponin T  0.16<H>,  CPK  X    , CKMB  X    , BNP X        , ( 06 Jun 2023 16:42 )  Troponin T  0.18<H>,  CPK  X    , CKMB  X    , BNP X                                  9.2    13.45 )-----------( 132      ( 07 Jun 2023 05:45 )             27.5     06-07    135  |  102  |  17.7  ----------------------------<  114<H>  4.2   |  24.0  |  0.79    Ca    7.4<L>      07 Jun 2023 05:45  Phos  2.5     06-07  Mg     1.9     06-07    TPro  5.4<L>  /  Alb  3.0<L>  /  TBili  1.2  /  DBili  x   /  AST  18  /  ALT  13  /  AlkPhos  46  06-05    PT/INR/PTT ( 05 Jun 2023 19:55 )                       :                       :      12.4         :       X                     .        .                   .              .           .       1.07        .

## 2023-06-07 NOTE — PROGRESS NOTE ADULT - ASSESSMENT
-HD stable this am.  Currently off phenylephrine.  Follow trend.  Currently w/elevated heart rate in setting of fever.  Will give tylenol which hopefully will help with heart rate.  Will start midodrine and then hopefully patient will be able to tolerate verapamil as per cardiology recs (patient not a candidate for beta blockade).  Troponin flat.  -Breathing comfortably on NC  -OOB to chair/PT  -Tolerating diet.  -SCDs, Lovenox  -Voiding, cr stable    -will remain in critical care setting until consistently off phenylephrine   68 year old gentleman w/CAD, HTN, HLN, AS and HOCM who was admitted 6/3/23 after falling 10ft off a ladder on 6/3/23.  Found to have a right distal femur fracture.  Transferred to critical care unit 6/6/23 when he was noted to have a significant drop in H/H and mild hypotension.  Improved after transfusion of 3UPRBC and initiation of pheynlephrine.  F/U CT scan abd/pelvis did not show active bleeding.  Now POD #1 s/p ORIF R Distal Femur w/retrograde IM nail.    -HD stable this am.  Currently off phenylephrine.  Follow trend.  Currently w/elevated heart rate in setting of fever.  Will give tylenol which hopefully will help with heart rate.  Will start midodrine and then hopefully patient will be able to tolerate verapamil as per cardiology recs (patient not a candidate for beta blockade).  Troponin flat.  Cardiology states that they are happy w/heart rates less than 115.  Patient reports that his heartrate is rarely below 100 at home/baseline.  -Breathing comfortably on NC  -OOB to chair/PT -WBAT per ortho  -Tolerating diet.  -SCDs, Lovenox  -Voiding, cr stable  -Low grade temps now - likely post op plus atelectasis - incentive spirometry/tylenol, WBC mildly elevated but improved, off antibiotics  -No deep lines    -Will remain in critical care setting until consistently off phenylephrine.  Discussed w/patient and wife at bedside.

## 2023-06-07 NOTE — PROGRESS NOTE ADULT - PROBLEM SELECTOR PLAN 1
- known HOCM, previous AKILAH showing hyperdynamic LV, mod-sev MR w/ JUSTINA, and prolapse of posterior MV leaflet.  -Echo this admission 60-65%, mod JUSTINA, mod-severe MR   - on verapamil as outpatient, cannot tolerate due to distributive shock currently, pt febrile and hypotensive on phenylephrine   - stop phenylephrine, transition w/ midodrine 5mg TID   - will use disopyramide 200mg BID for rate control/HOCM. Currently HR is ~115 ST, goal is <70   - monitor for bradycardia and anticholinergic effects   - Avoid hypotension, hypovolemia.   - cardiology will follow

## 2023-06-07 NOTE — PHYSICAL THERAPY INITIAL EVALUATION ADULT - ADDITIONAL COMMENTS
Pt AxOx4 states he lives at home with 3STE 1HR and 0steps inside. Pt was independent PTA without use of DME, but owns RW and cane. Pt will be able to assist 24/7 upon d/c.
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
## 2023-06-07 NOTE — PHYSICAL THERAPY INITIAL EVALUATION ADULT - PASSIVE RANGE OF MOTION EXAMINATION, REHAB EVAL
RLE in bandage, unable to assess/bilateral upper extremity Passive ROM was WFL (within functional limits)/Left LE Passive ROM was WFL (within functional limits)

## 2023-06-07 NOTE — PHYSICAL THERAPY INITIAL EVALUATION ADULT - PERTINENT HX OF CURRENT PROBLEM, REHAB EVAL
68M presenting s/p fall from 10ft on ladder. Patient was painting a skylight when the ladder he was standing on buckled and he fell on it, denies HS/LOC. He is unsure how he landed but his RLE hurt immediately and he was not able to bear weight. RLE distal femur fx, longitudinal patella fx found.
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
## 2023-06-07 NOTE — PROGRESS NOTE ADULT - NS ATTEND AMEND GEN_ALL_CORE FT
seen with above,    68M history significant for HTN, HLD, nonobstructive CAD (by CCTA with LAD 50%, OM 40%), HOCM with JUSTINA/severe MR was following with [facility] with Dr. Hackett and Dr. Antonio, reports intolerant to metoprolol with diarrhea, mechanical fall with ladder at home with R-distal femoral fracture with severe acute blood loss anemia Hb cole 6.2, monitoring in SICU for fluid hydration and vasopressor, -Echo with preserved LV EF with LVOT gradient of 90 mmHg, went to OR yesterday s/p ORIF  -since postop with HR 100s likely due to pain and SBP 90-100s was on phenylephrine not able to tolerate verapamil, add midodrine 5mg TID for now and consider rechallenge with metoprolol, anticipate lowering of HR will improve SBP given HOCM need negative inotropic agent,   -continue maintain adequate fluid hydration for preload, off thiazide diuretic from home  -transfuse to keep Hb >8  -once BP improve to restart verapamil to keep HR ideal goal 60 to reduce LVOT obstruction, if BP cannot tolerate may need Norpace  -outpatient evaluation for Mevacatem, and may need MV replacement in the future.         Wei Poe DO, [facility]  Faculty Non-Invasive Cardiologist  [phone]. seen with above,    68M history significant for HTN, HLD, nonobstructive CAD (by CCTA with LAD 50%, OM 40%), HOCM with JUSTINA/severe MR was following with [facility] with Dr. Hackett and Dr. Antonio, reports intolerant to metoprolol with diarrhea, mechanical fall with ladder at home with R-distal femoral fracture with severe acute blood loss anemia Hb coel 6.2, monitoring in SICU for fluid hydration and vasopressor, -Echo with preserved LV EF with LVOT gradient of 90 mmHg, went to OR yesterday s/p ORIF  -since postop with HR 100s likely due to pain and SBP 90-100s was on phenylephrine not able to tolerate verapamil, add midodrine 5mg TID for now and consider rechallenge with metoprolol, anticipate lowering of HR will improve SBP given HOCM need negative inotropic agent,   -continue maintain adequate fluid hydration for preload, off thiazide diuretic from home  -transfuse to keep Hb >8  -once BP improve to restart verapamil to keep HR ideal goal 60 to reduce LVOT obstruction, given BP cannot tolerate BB or CCB for now will try disopyramide ordered for 200mg BID but baseline  on 6/3, repeat EKG if remains >480s then reduce dose to 100mg BID, if QTC >520s need to stop disopyramide use.   -outpatient evaluation for Mevacatem, and may need MV replacement in the future.         Wei Poe DO, [facility]  Faculty Non-Invasive Cardiologist  [phone].

## 2023-06-07 NOTE — PHYSICAL THERAPY INITIAL EVALUATION ADULT - ACTIVE RANGE OF MOTION EXAMINATION, REHAB EVAL
RLE in bandage, unable to assess/bilateral upper extremity Active ROM was WFL (within functional limits)/Left LE Active ROM was WFL (within functional limits)

## 2023-06-08 LAB
ANION GAP SERPL CALC-SCNC: 10 MMOL/L — SIGNIFICANT CHANGE UP (ref 5–17)
BASOPHILS # BLD AUTO: 0.03 K/UL — SIGNIFICANT CHANGE UP (ref 0–0.2)
BASOPHILS NFR BLD AUTO: 0.3 % — SIGNIFICANT CHANGE UP (ref 0–2)
BUN SERPL-MCNC: 16.5 MG/DL — SIGNIFICANT CHANGE UP (ref 8–20)
CALCIUM SERPL-MCNC: 7.7 MG/DL — LOW (ref 8.4–10.5)
CHLORIDE SERPL-SCNC: 101 MMOL/L — SIGNIFICANT CHANGE UP (ref 96–108)
CO2 SERPL-SCNC: 26 MMOL/L — SIGNIFICANT CHANGE UP (ref 22–29)
CREAT SERPL-MCNC: 0.68 MG/DL — SIGNIFICANT CHANGE UP (ref 0.5–1.3)
EGFR: 101 ML/MIN/1.73M2 — SIGNIFICANT CHANGE UP
EOSINOPHIL # BLD AUTO: 0.15 K/UL — SIGNIFICANT CHANGE UP (ref 0–0.5)
EOSINOPHIL NFR BLD AUTO: 1.5 % — SIGNIFICANT CHANGE UP (ref 0–6)
GLUCOSE SERPL-MCNC: 99 MG/DL — SIGNIFICANT CHANGE UP (ref 70–99)
HCT VFR BLD CALC: 22 % — LOW (ref 39–50)
HCT VFR BLD CALC: 23.7 % — LOW (ref 39–50)
HGB BLD-MCNC: 7.6 G/DL — LOW (ref 13–17)
HGB BLD-MCNC: 8.3 G/DL — LOW (ref 13–17)
IMM GRANULOCYTES NFR BLD AUTO: 0.9 % — SIGNIFICANT CHANGE UP (ref 0–0.9)
LYMPHOCYTES # BLD AUTO: 0.76 K/UL — LOW (ref 1–3.3)
LYMPHOCYTES # BLD AUTO: 7.7 % — LOW (ref 13–44)
MAGNESIUM SERPL-MCNC: 1.9 MG/DL — SIGNIFICANT CHANGE UP (ref 1.6–2.6)
MCHC RBC-ENTMCNC: 29.9 PG — SIGNIFICANT CHANGE UP (ref 27–34)
MCHC RBC-ENTMCNC: 30.2 PG — SIGNIFICANT CHANGE UP (ref 27–34)
MCHC RBC-ENTMCNC: 34.5 GM/DL — SIGNIFICANT CHANGE UP (ref 32–36)
MCHC RBC-ENTMCNC: 35 GM/DL — SIGNIFICANT CHANGE UP (ref 32–36)
MCV RBC AUTO: 86.2 FL — SIGNIFICANT CHANGE UP (ref 80–100)
MCV RBC AUTO: 86.6 FL — SIGNIFICANT CHANGE UP (ref 80–100)
MONOCYTES # BLD AUTO: 1.16 K/UL — HIGH (ref 0–0.9)
MONOCYTES NFR BLD AUTO: 11.7 % — SIGNIFICANT CHANGE UP (ref 2–14)
NEUTROPHILS # BLD AUTO: 7.71 K/UL — HIGH (ref 1.8–7.4)
NEUTROPHILS NFR BLD AUTO: 77.9 % — HIGH (ref 43–77)
PHOSPHATE SERPL-MCNC: 1.7 MG/DL — LOW (ref 2.4–4.7)
PLATELET # BLD AUTO: 119 K/UL — LOW (ref 150–400)
PLATELET # BLD AUTO: 137 K/UL — LOW (ref 150–400)
POTASSIUM SERPL-MCNC: 3.8 MMOL/L — SIGNIFICANT CHANGE UP (ref 3.5–5.3)
POTASSIUM SERPL-SCNC: 3.8 MMOL/L — SIGNIFICANT CHANGE UP (ref 3.5–5.3)
RBC # BLD: 2.54 M/UL — LOW (ref 4.2–5.8)
RBC # BLD: 2.75 M/UL — LOW (ref 4.2–5.8)
RBC # FLD: 14.6 % — HIGH (ref 10.3–14.5)
RBC # FLD: 14.8 % — HIGH (ref 10.3–14.5)
SODIUM SERPL-SCNC: 137 MMOL/L — SIGNIFICANT CHANGE UP (ref 135–145)
WBC # BLD: 11.34 K/UL — HIGH (ref 3.8–10.5)
WBC # BLD: 9.9 K/UL — SIGNIFICANT CHANGE UP (ref 3.8–10.5)
WBC # FLD AUTO: 11.34 K/UL — HIGH (ref 3.8–10.5)
WBC # FLD AUTO: 9.9 K/UL — SIGNIFICANT CHANGE UP (ref 3.8–10.5)

## 2023-06-08 PROCEDURE — 99232 SBSQ HOSP IP/OBS MODERATE 35: CPT | Mod: FS

## 2023-06-08 PROCEDURE — 93010 ELECTROCARDIOGRAM REPORT: CPT

## 2023-06-08 PROCEDURE — 99234 HOSP IP/OBS SM DT SF/LOW 45: CPT

## 2023-06-08 RX ORDER — OXYCODONE HYDROCHLORIDE 5 MG/1
10 TABLET ORAL EVERY 4 HOURS
Refills: 0 | Status: DISCONTINUED | OUTPATIENT
Start: 2023-06-08 | End: 2023-06-12

## 2023-06-08 RX ORDER — ACETAMINOPHEN 500 MG
975 TABLET ORAL EVERY 6 HOURS
Refills: 0 | Status: DISCONTINUED | OUTPATIENT
Start: 2023-06-08 | End: 2023-06-12

## 2023-06-08 RX ORDER — MAGNESIUM SULFATE 500 MG/ML
2 VIAL (ML) INJECTION ONCE
Refills: 0 | Status: COMPLETED | OUTPATIENT
Start: 2023-06-08 | End: 2023-06-08

## 2023-06-08 RX ORDER — OXYCODONE HYDROCHLORIDE 5 MG/1
5 TABLET ORAL EVERY 4 HOURS
Refills: 0 | Status: DISCONTINUED | OUTPATIENT
Start: 2023-06-08 | End: 2023-06-12

## 2023-06-08 RX ORDER — METOPROLOL TARTRATE 50 MG
12.5 TABLET ORAL
Refills: 0 | Status: DISCONTINUED | OUTPATIENT
Start: 2023-06-08 | End: 2023-06-10

## 2023-06-08 RX ORDER — HYDROMORPHONE HYDROCHLORIDE 2 MG/ML
0.5 INJECTION INTRAMUSCULAR; INTRAVENOUS; SUBCUTANEOUS ONCE
Refills: 0 | Status: DISCONTINUED | OUTPATIENT
Start: 2023-06-08 | End: 2023-06-08

## 2023-06-08 RX ORDER — POTASSIUM PHOSPHATE, MONOBASIC POTASSIUM PHOSPHATE, DIBASIC 236; 224 MG/ML; MG/ML
30 INJECTION, SOLUTION INTRAVENOUS ONCE
Refills: 0 | Status: COMPLETED | OUTPATIENT
Start: 2023-06-08 | End: 2023-06-08

## 2023-06-08 RX ORDER — HYDROMORPHONE HYDROCHLORIDE 2 MG/ML
0.5 INJECTION INTRAMUSCULAR; INTRAVENOUS; SUBCUTANEOUS
Refills: 0 | Status: DISCONTINUED | OUTPATIENT
Start: 2023-06-08 | End: 2023-06-12

## 2023-06-08 RX ADMIN — ENOXAPARIN SODIUM 40 MILLIGRAM(S): 100 INJECTION SUBCUTANEOUS at 05:21

## 2023-06-08 RX ADMIN — HYDROMORPHONE HYDROCHLORIDE 0.5 MILLIGRAM(S): 2 INJECTION INTRAMUSCULAR; INTRAVENOUS; SUBCUTANEOUS at 10:50

## 2023-06-08 RX ADMIN — HYDROMORPHONE HYDROCHLORIDE 0.5 MILLIGRAM(S): 2 INJECTION INTRAMUSCULAR; INTRAVENOUS; SUBCUTANEOUS at 21:05

## 2023-06-08 RX ADMIN — Medication 975 MILLIGRAM(S): at 11:28

## 2023-06-08 RX ADMIN — POTASSIUM PHOSPHATE, MONOBASIC POTASSIUM PHOSPHATE, DIBASIC 83.33 MILLIMOLE(S): 236; 224 INJECTION, SOLUTION INTRAVENOUS at 09:01

## 2023-06-08 RX ADMIN — OXYCODONE HYDROCHLORIDE 10 MILLIGRAM(S): 5 TABLET ORAL at 11:27

## 2023-06-08 RX ADMIN — HYDROMORPHONE HYDROCHLORIDE 0.5 MILLIGRAM(S): 2 INJECTION INTRAMUSCULAR; INTRAVENOUS; SUBCUTANEOUS at 07:53

## 2023-06-08 RX ADMIN — HYDROMORPHONE HYDROCHLORIDE 0.5 MILLIGRAM(S): 2 INJECTION INTRAMUSCULAR; INTRAVENOUS; SUBCUTANEOUS at 08:15

## 2023-06-08 RX ADMIN — HYDROMORPHONE HYDROCHLORIDE 0.5 MILLIGRAM(S): 2 INJECTION INTRAMUSCULAR; INTRAVENOUS; SUBCUTANEOUS at 03:02

## 2023-06-08 RX ADMIN — Medication 12.5 MILLIGRAM(S): at 17:18

## 2023-06-08 RX ADMIN — HYDROMORPHONE HYDROCHLORIDE 0.5 MILLIGRAM(S): 2 INJECTION INTRAMUSCULAR; INTRAVENOUS; SUBCUTANEOUS at 10:36

## 2023-06-08 RX ADMIN — CHLORHEXIDINE GLUCONATE 1 APPLICATION(S): 213 SOLUTION TOPICAL at 11:28

## 2023-06-08 RX ADMIN — OXYCODONE HYDROCHLORIDE 10 MILLIGRAM(S): 5 TABLET ORAL at 06:21

## 2023-06-08 RX ADMIN — Medication 975 MILLIGRAM(S): at 17:18

## 2023-06-08 RX ADMIN — Medication 975 MILLIGRAM(S): at 23:50

## 2023-06-08 RX ADMIN — MIDODRINE HYDROCHLORIDE 5 MILLIGRAM(S): 2.5 TABLET ORAL at 05:21

## 2023-06-08 RX ADMIN — HYDROMORPHONE HYDROCHLORIDE 0.5 MILLIGRAM(S): 2 INJECTION INTRAMUSCULAR; INTRAVENOUS; SUBCUTANEOUS at 04:00

## 2023-06-08 RX ADMIN — Medication 25 GRAM(S): at 07:06

## 2023-06-08 RX ADMIN — Medication 975 MILLIGRAM(S): at 23:55

## 2023-06-08 RX ADMIN — HYDROMORPHONE HYDROCHLORIDE 0.5 MILLIGRAM(S): 2 INJECTION INTRAMUSCULAR; INTRAVENOUS; SUBCUTANEOUS at 21:20

## 2023-06-08 RX ADMIN — MIDODRINE HYDROCHLORIDE 5 MILLIGRAM(S): 2.5 TABLET ORAL at 17:19

## 2023-06-08 RX ADMIN — MIDODRINE HYDROCHLORIDE 5 MILLIGRAM(S): 2.5 TABLET ORAL at 21:11

## 2023-06-08 RX ADMIN — FINASTERIDE 5 MILLIGRAM(S): 5 TABLET, FILM COATED ORAL at 05:21

## 2023-06-08 RX ADMIN — OXYCODONE HYDROCHLORIDE 10 MILLIGRAM(S): 5 TABLET ORAL at 05:21
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
## 2023-06-08 NOTE — CHART NOTE - NSCHARTNOTEFT_GEN_A_CORE
SICU TRANSFER NOTE  -----------------------------  ICU Admission Date: 6/6  Transfer Date: 06-08-23 @ 14:11    Admission Diagnosis: Distal femur fx, acute blood loss anemia DAVID    Active Problems/injuries: h/o AS and HOCM    Procedures: XXXXX INCLUDE DATES    Consultants:  [ x] Cardiology  [ ] Endocrine  [ ] Infectious Disease  [ ] Medicine  [ ]Neurosurgery  [x ] Ortho       [ x] Weight Bearing Status:  WBAT  [ ] Palliative       [ ] Advanced Directives:    [ ] Physical Medicine and Rehab       [ ] Disposition :   [ ] Plastics  [ ] Pulmonary    Medications  acetaminophen     Tablet .. 975 milliGRAM(s) Oral every 6 hours  enoxaparin Injectable 40 milliGRAM(s) SubCutaneous every 24 hours  finasteride 5 milliGRAM(s) Oral every 24 hours  HYDROmorphone  Injectable 0.5 milliGRAM(s) IV Push every 3 hours PRN  metoprolol tartrate 12.5 milliGRAM(s) Oral two times a day  midodrine 5 milliGRAM(s) Oral every 8 hours  oxyCODONE    IR 10 milliGRAM(s) Oral every 4 hours PRN  oxyCODONE    IR 5 milliGRAM(s) Oral every 4 hours PRN  polyethylene glycol 3350 17 Gram(s) Oral daily  senna 2 Tablet(s) Oral at bedtime      [x ] I attest I have reviewed and reconciled all medications prior to transfer    IV Fluids  sodium chloride 0.9%.: Solution, 1000 milliLiter(s) infuse at 100 mL/Hr  Provider's Contact #: [phone]  sodium chloride 0.9% Bolus:   1000 milliLiter(s), IV Bolus, once, infuse over 60 Minute(s), Stop After 1 Doses  Provider's Contact #: [phone]    Indication: XXXXXX    Antibiotics:    Indication: XXXXXXX End Date:XXXXXXX      I have discussed this case with VIRGINIE Rehman upon transfer and all questions regarding ICU course were answered.  The following items are to be followed up:      Pt with h/o AS and HOCM, off Neop post op  On Midodrine 5mg TID  Cards following, recs:  - disopyramide d/c'd due to prolonged QTC (535)  - Repeat QTC from 6/8/2023 is 503.   - baseline QTC is in the 490s on 6/3  - start metoprolol 12.5 mg BID with BP/HR parameters for rate control.   - monitor for bradycardia and hypotension.   - Avoid hypotension, hypovolemia.   - Recommend to transfuse if HgB <8.   - maintain adequate fluid hydration for preload, currently off thiazide diuretics which is his home medication   - outpatient evaluation for Mevacatem. May need MV replacement in the future.    Tolerating diet  Pain well controlled  Voids  Febrile overnight yesterday, likely 2/2 to atelectasis  -IS given to pt at bedside  -If spike again, would cx    PT eval recs home vs [facility], pt wants home  -still pending progress with PT for final recs

## 2023-06-08 NOTE — CHART NOTE - NSCHARTNOTEFT_GEN_A_CORE
SICU TRANSFER NOTE  -----------------------------  ICU Admission Date: 6/6  Transfer Date: 06-08-23 @ 12:06    Admission Diagnosis: distal femur fracture    Active Problems/injuries: distal femur fracture, HCM    Procedures:R im nail 6/7    Consultants:  [x ] Cardiology  [ ] Endocrine  [ ] Infectious Disease  [ ] Medicine  [ ]Neurosurgery  [x ] Ortho       [ x] Weight Bearing Status: WBAT RLE  [ ] Palliative       [ ] Advanced Directives:    [ ] Physical Medicine and Rehab       [ ] Disposition :   [ ] Plastics  [ ] Pulmonary    Medications  acetaminophen     Tablet .. 975 milliGRAM(s) Oral every 6 hours  enoxaparin Injectable 40 milliGRAM(s) SubCutaneous every 24 hours  finasteride 5 milliGRAM(s) Oral every 24 hours  HYDROmorphone  Injectable 0.5 milliGRAM(s) IV Push every 3 hours PRN  metoprolol tartrate 12.5 milliGRAM(s) Oral two times a day  midodrine 5 milliGRAM(s) Oral every 8 hours  oxyCODONE    IR 10 milliGRAM(s) Oral every 4 hours PRN  oxyCODONE    IR 5 milliGRAM(s) Oral every 4 hours PRN  polyethylene glycol 3350 17 Gram(s) Oral daily  senna 2 Tablet(s) Oral at bedtime      [ x ] I attest I have reviewed and reconciled all medications prior to transfer        I have discussed this case with Trauma surgery team upon transfer and all questions regarding ICU course were answered.  The following items are to be followed up:    -Isolated fever overnight, continue to monitor, currently no concerns for active infection  -Cardiology following for hypertrophic cardiomyopathy, off verapamil, lopressor started today, monitor for tachycardia, continue midodrine for now.   -FU PT, WBAT RLE  -Dispo planning

## 2023-06-08 NOTE — PROGRESS NOTE ADULT - PROBLEM SELECTOR PLAN 1
- known HOCM, previous AKIALH showing hyperdynamic LV, mod-sev MR w/ JUSTINA, and prolapse of posterior MV leaflet.  -Echo this admission 60-65%, mod JUSTINA, mod-severe MR   - on verapamil as outpatient, cannot tolerate due to distributive shock currently, pt febrile and hypotensive (currently on midodrine now)   - hypotension is resolving, currently off phenylephrine.   - c/w midodrine 5mg TID   - disopyramide d/c'd due to prolonged QTC (535)  - Repeat EKG to monitor for prolonged QTC   - baseline QTC is in the 490s on 6/3  - start metoprolol 12.5 mg BID with BP/HR parameters for rate control.   - monitor for bradycardia and hypotension.   - Avoid hypotension, hypovolemia.   - Recommend to transfuse if HgB <8.   - maintain adequate fluid hydration for preload, currently off thiazide diuretics which is his home medication   - outpatient evaluation for Mevacatem. May need MV replacement in the future. - known HOCM, previous AKILAH showing hyperdynamic LV, mod-sev MR w/ JUSTINA, and prolapse of posterior MV leaflet.  -Echo this admission 60-65%, mod JUSTINA, mod-severe MR   - on verapamil as outpatient, cannot tolerate due to distributive shock currently, pt febrile and hypotensive (currently on midodrine now)   - hypotension is resolving, currently off phenylephrine.   - c/w midodrine 5mg TID   - disopyramide d/c'd due to prolonged QTC (535)  - Repeat QTC from 6/8/2023 is 503.   - baseline QTC is in the 490s on 6/3  - start metoprolol 12.5 mg BID with BP/HR parameters for rate control.   - monitor for bradycardia and hypotension.   - Avoid hypotension, hypovolemia.   - Recommend to transfuse if HgB <8.   - maintain adequate fluid hydration for preload, currently off thiazide diuretics which is his home medication   - outpatient evaluation for Mevacatem. May need MV replacement in the future.

## 2023-06-08 NOTE — PROGRESS NOTE ADULT - SUBJECTIVE AND OBJECTIVE BOX
[facility]                                                         CARDIOLOGY AT [place] [address]                                                         Telephone: [phone]. Fax:[phone]                                                                             PROGRESS NOTE    Reason for follow up: HOCM  Update: tachycardic with HR in the low 100s. Off phenylephrine drip.       Review of symptoms:   Cardiac:  No chest pain. No dyspnea. No palpitations.  Respiratory: no cough. No dyspnea  Gastrointestinal: No diarrhea. No abdominal pain. No bleeding.   Neuro: No focal neuro complaints.    Vitals:  T(C): 37.6 (06-08-23 @ 07:15), Max: 38.9 (06-07-23 @ 20:00)  HR: 105 (06-08-23 @ 09:00) (93 - 113)  BP: 121/65 (06-08-23 @ 09:00) (86/62 - 150/76)  RR: 17 (06-08-23 @ 09:00) (13 - 24)  SpO2: 94% (06-08-23 @ 09:00) (89% - 100%)  Wt(kg): --  I&O's Summary    07 Jun 2023 07:01  -  08 Jun 2023 07:00  --------------------------------------------------------  IN: 25 mL / OUT: 1700 mL / NET: -1675 mL    08 Jun 2023 07:01  -  08 Jun 2023 09:47  --------------------------------------------------------  IN: 108.3 mL / OUT: 200 mL / NET: -91.7 mL      Weight (kg): 85.3 (06-05 @ 12:46)    PHYSICAL EXAM:  Appearance: Comfortable. No acute distress  HEENT:  Atraumatic. Normocephalic.  Normal oral mucosa  Neurologic: A & O x 3, no gross focal deficits.  Cardiovascular: RRR S1 S2, No murmur, no rubs/gallops. No JVD  Respiratory: Lungs clear to auscultation, unlabored. 2L nasal cannula.   Gastrointestinal:  Soft, Non-tender, + BS  Lower Extremities: 2+ Peripheral Pulses, No clubbing, cyanosis, or edema  Psychiatry: Patient is calm. No agitation.   Skin: warm and dry.    CURRENT CARDIAC MEDICATIONS:  midodrine 5 milliGRAM(s) Oral every 8 hours  phenylephrine    Infusion 0.1 MICROgram(s)/kG/Min IV Continuous <Continuous>      CURRENT OTHER MEDICATIONS:  acetaminophen     Tablet .. 975 milliGRAM(s) Oral every 6 hours PRN Mild Pain (1 - 3)  HYDROmorphone  Injectable 0.5 milliGRAM(s) IV Push every 4 hours PRN Breakthrough pain  oxyCODONE    IR 5 milliGRAM(s) Oral every 6 hours PRN Moderate Pain (4 - 6)  oxyCODONE    IR 10 milliGRAM(s) Oral every 6 hours PRN Severe Pain (7 - 10)  polyethylene glycol 3350 17 Gram(s) Oral daily  senna 2 Tablet(s) Oral at bedtime  finasteride 5 milliGRAM(s) Oral every 24 hours  chlorhexidine 2% Cloths 1 Application(s) Topical daily  enoxaparin Injectable 40 milliGRAM(s) SubCutaneous every 24 hours      LABS:	 	  ( 07 Jun 2023 05:45 )  Troponin T  0.16<H>,  CPK  X    , CKMB  X    , BNP X        , ( 06 Jun 2023 21:20 )  Troponin T  0.16<H>,  CPK  X    , CKMB  X    , BNP X        , ( 06 Jun 2023 16:42 )  Troponin T  0.18<H>,  CPK  X    , CKMB  X    , BNP X                                  8.3    11.34 )-----------( 137      ( 08 Jun 2023 07:50 )             23.7     06-08    137  |  101  |  16.5  ----------------------------<  99  3.8   |  26.0  |  0.68    Ca    7.7<L>      08 Jun 2023 05:45  Phos  1.7     06-08  Mg     1.9     06-08      PT/INR/PTT ( 05 Jun 2023 19:55 )                       :                       :      12.4         :       X                     .        .                   .              .           .       1.07        .                                       Lipid Profile:   HgA1c: a  TSH:     TELEMETRY: NSR/ST low 100s.   ECG: NSR     DIAGNOSTIC TESTING:  [ x] Echocardiogram: < from: TTE Echo Complete w/ Contrast w/ Doppler (06.05.23 @ 20:20) >   1. Technically difficult study.   2. Left ventricular ejection fraction, by visual estimation, is 60 to   65%.   3. Normal globalleft ventricular systolic function.   4. There is severe concentric left ventricular hypertrophy.   5. Hypertrophic CMP with LVOT max gradient 96 mmHg.   6. Severely enlarged left atrium.   7. Degenerative mitral valve.   8. Mild to moderate mitral annular calcification.   9. Moderate anterior leaflet systolic anterior motion of the mitral   valve.  10. Moderate to severe mitral valve regurgitation.  11. Prolapse of posterior leaflet of the mitral valve.  12. Mild tricuspid regurgitation.  13. Mild aortic regurgitation.  14. Sclerotic aortic valve with decreased opening.  15. Estimated pulmonary artery systolic pressure is 39.2 mmHg assuming a   right atrial pressure of 3 mmHg, which is consistent with borderline   pulmonary hypertension.  16. There is a large liver cyst 8.7cm x 9.12 cm, consider dedicated liver   imaging.    < end of copied text >    [ ]  Catheterization:  [ ] Stress Test:    OTHER:

## 2023-06-08 NOTE — PROGRESS NOTE ADULT - ASSESSMENT
68M PMH CAD (CCTA LAD 50%, OM 40%), HTN, HLD, HOCM, ?pAF presenting s/p fall from 10ft on ladder, right femur fx. Cardiology following for HOCM management pre and post op.

## 2023-06-08 NOTE — PROGRESS NOTE ADULT - SUBJECTIVE AND OBJECTIVE BOX
24h Events:  Verapamil DC'd 2/2 hypotension. Considered norpace but qtc was prohibitively high. Now on midodrine. Pressors weaned off.    Subjective:  Pt offers no acute complaints. Denies abdominal pain, chest pain, fever/chills, shortness of breath, nausea, vomiting, diarrhea, headache.     ICU Vital Signs Last 24 Hrs  T(C): 38 (07 Jun 2023 23:22), Max: 38.9 (07 Jun 2023 20:00)  T(F): 100.4 (07 Jun 2023 23:22), Max: 102 (07 Jun 2023 20:00)  HR: 96 (08 Jun 2023 00:00) (72 - 113)  BP: 121/56 (08 Jun 2023 00:00) (82/67 - 150/76)  BP(mean): 72 (08 Jun 2023 00:00) (61 - 111)  ABP: --  ABP(mean): --  RR: 14 (08 Jun 2023 00:00) (12 - 25)  SpO2: 97% (08 Jun 2023 00:00) (89% - 100%)    O2 Parameters below as of 07 Jun 2023 20:00  Patient On (Oxygen Delivery Method): nasal cannula  O2 Flow (L/min): 2              MEDICATIONS  (STANDING):  chlorhexidine 2% Cloths 1 Application(s) Topical daily  enoxaparin Injectable 40 milliGRAM(s) SubCutaneous every 24 hours  finasteride 5 milliGRAM(s) Oral every 24 hours  midodrine 5 milliGRAM(s) Oral every 8 hours  phenylephrine    Infusion 0.1 MICROgram(s)/kG/Min (3.2 mL/Hr) IV Continuous <Continuous>  polyethylene glycol 3350 17 Gram(s) Oral daily  senna 2 Tablet(s) Oral at bedtime    MEDICATIONS  (PRN):  acetaminophen     Tablet .. 975 milliGRAM(s) Oral every 6 hours PRN Mild Pain (1 - 3)  HYDROmorphone  Injectable 0.5 milliGRAM(s) IV Push every 4 hours PRN Breakthrough pain  oxyCODONE    IR 5 milliGRAM(s) Oral every 6 hours PRN Moderate Pain (4 - 6)  oxyCODONE    IR 10 milliGRAM(s) Oral every 6 hours PRN Severe Pain (7 - 10)          Physical Exam:    Gen: Resting comfortably in bed    HEENT: PERRL, EOMI    Neurological: Alert and oriented x3 without focal deficit    Neck: Trachea midline, no evidence of JVD, FROM without pain, neck symmetric    Pulmonary: CTAB with decreased breath sounds at the bases    Cardiovascular: S1S2    Gastrointestinal: Soft, non-tender, non-distended    Extremities: RLE with ace wrap in place, moving ankle/toes. Sensation intact    Musculoskeletal: FROM without pain, no deformity or areas of tenderness    LABS:  Pending      ASSESSMENT/PLAN:  68y Male with h/o HOCM admitted with R distal femur fx s/p IM nail    Neuro: Pain control, delirium precautions, sleep hygiene.     CV: Hemodynamically well on midodrine. Avoid tachycardia. Continue non-invasive blood pressure monitoring. Monitor h/h    Pulm: Encourage incentive spirometry, OOB as tolerated. Titrate supplemental oxygen to maintain SpO2 92-99%     GI/Nutrition: Regular diet, IVL    /Renal: Voiding spontaneously, f/u am labs     ID: No active issues     Endo: No active issues     Skin: Wound care as per surgical team    Heme/DVT Prophylaxis: SCDs, lovenox

## 2023-06-08 NOTE — PROGRESS NOTE ADULT - NS ATTEND AMEND GEN_ALL_CORE FT
seen with above,    68M history significant for HTN, HLD, nonobstructive CAD (by CCTA with LAD 50%, OM 40%), HOCM with JUSTINA/severe MR was following with [facility] with Dr. Hackett and Dr. Antonio, reports intolerant to metoprolol with diarrhea, mechanical fall with ladder at home with R-distal femoral fracture with severe acute blood loss anemia Hb cole 6.2, monitoring in SICU for fluid hydration and vasopressor, -Echo with preserved LV EF with LVOT gradient of 90 mmHg, went to OR yesterday s/p ORIF  -since postop with HR 100s likely due to pain and SBP 90-100s was on phenylephrine not able to tolerate verapamil, added midodrine 5mg TID since off vasopressor, given BP cannot tolerate BB or CCB attempted disopyramide  on 6/3 increased to 530s cannot tolerate disopyramide use.   -rechalleng with low dose metoprolol 12.5mg BID and wean off midodrine use  -transfuse to keep Hb >8  -low grade fever 100.7 continue to monitor   -OOT/ambulate, elevated risk for VTE  -outpatient evaluation for Mevacatem, and may need MV replacement in the future.         Wei Poe DO, [facility]  Faculty Non-Invasive Cardiologist  [phone].

## 2023-06-08 NOTE — PROGRESS NOTE ADULT - NS ATTEND AMEND GEN_ALL_CORE FT
I have seen and examined the patient during SICU multidisciplinary rounds from 9884-8942 hrs.   Events noted.    Neuro: Awake, pain under control  CV: HD normal on PO midodrine  Pulm: SaO2 adequate  GI: TEN  : Urine ow adequate, electrolytes hypophosphatemia  ID: marcello iop abx  Heme: H/H stable, DVT chemoprophylaxes  Endo: glycemia at target v     Plan:  Femur FX s/p ORIF loss anemia HOCM  Appreciate cardiology input   resume  beta blockers  wean midodrine   DVT chemoprophylaxis   Transfer  to floor,.  DVT chemoprohyalxys   Dispo planning

## 2023-06-09 ENCOUNTER — APPOINTMENT (OUTPATIENT)
Dept: CARDIOTHORACIC SURGERY | Facility: CLINIC | Age: 68
End: 2023-06-09

## 2023-06-09 LAB
ANION GAP SERPL CALC-SCNC: 10 MMOL/L — SIGNIFICANT CHANGE UP (ref 5–17)
BUN SERPL-MCNC: 17.6 MG/DL — SIGNIFICANT CHANGE UP (ref 8–20)
CALCIUM SERPL-MCNC: 7.8 MG/DL — LOW (ref 8.4–10.5)
CHLORIDE SERPL-SCNC: 104 MMOL/L — SIGNIFICANT CHANGE UP (ref 96–108)
CO2 SERPL-SCNC: 24 MMOL/L — SIGNIFICANT CHANGE UP (ref 22–29)
CREAT SERPL-MCNC: 0.67 MG/DL — SIGNIFICANT CHANGE UP (ref 0.5–1.3)
EGFR: 101 ML/MIN/1.73M2 — SIGNIFICANT CHANGE UP
GLUCOSE SERPL-MCNC: 104 MG/DL — HIGH (ref 70–99)
HCT VFR BLD CALC: 24.4 % — LOW (ref 39–50)
HGB BLD-MCNC: 8.2 G/DL — LOW (ref 13–17)
MAGNESIUM SERPL-MCNC: 2.1 MG/DL — SIGNIFICANT CHANGE UP (ref 1.6–2.6)
MCHC RBC-ENTMCNC: 29.7 PG — SIGNIFICANT CHANGE UP (ref 27–34)
MCHC RBC-ENTMCNC: 33.6 GM/DL — SIGNIFICANT CHANGE UP (ref 32–36)
MCV RBC AUTO: 88.4 FL — SIGNIFICANT CHANGE UP (ref 80–100)
PHOSPHATE SERPL-MCNC: 2.6 MG/DL — SIGNIFICANT CHANGE UP (ref 2.4–4.7)
PLATELET # BLD AUTO: 163 K/UL — SIGNIFICANT CHANGE UP (ref 150–400)
POTASSIUM SERPL-MCNC: 4 MMOL/L — SIGNIFICANT CHANGE UP (ref 3.5–5.3)
POTASSIUM SERPL-SCNC: 4 MMOL/L — SIGNIFICANT CHANGE UP (ref 3.5–5.3)
RBC # BLD: 2.76 M/UL — LOW (ref 4.2–5.8)
RBC # FLD: 14.8 % — HIGH (ref 10.3–14.5)
SODIUM SERPL-SCNC: 138 MMOL/L — SIGNIFICANT CHANGE UP (ref 135–145)
WBC # BLD: 10.3 K/UL — SIGNIFICANT CHANGE UP (ref 3.8–10.5)
WBC # FLD AUTO: 10.3 K/UL — SIGNIFICANT CHANGE UP (ref 3.8–10.5)

## 2023-06-09 PROCEDURE — 99232 SBSQ HOSP IP/OBS MODERATE 35: CPT

## 2023-06-09 RX ADMIN — Medication 975 MILLIGRAM(S): at 17:54

## 2023-06-09 RX ADMIN — Medication 975 MILLIGRAM(S): at 23:07

## 2023-06-09 RX ADMIN — OXYCODONE HYDROCHLORIDE 10 MILLIGRAM(S): 5 TABLET ORAL at 14:30

## 2023-06-09 RX ADMIN — Medication 975 MILLIGRAM(S): at 23:37

## 2023-06-09 RX ADMIN — HYDROMORPHONE HYDROCHLORIDE 0.5 MILLIGRAM(S): 2 INJECTION INTRAMUSCULAR; INTRAVENOUS; SUBCUTANEOUS at 16:10

## 2023-06-09 RX ADMIN — OXYCODONE HYDROCHLORIDE 10 MILLIGRAM(S): 5 TABLET ORAL at 23:37

## 2023-06-09 RX ADMIN — ENOXAPARIN SODIUM 40 MILLIGRAM(S): 100 INJECTION SUBCUTANEOUS at 05:09

## 2023-06-09 RX ADMIN — Medication 12.5 MILLIGRAM(S): at 05:06

## 2023-06-09 RX ADMIN — SENNA PLUS 2 TABLET(S): 8.6 TABLET ORAL at 23:08

## 2023-06-09 RX ADMIN — HYDROMORPHONE HYDROCHLORIDE 0.5 MILLIGRAM(S): 2 INJECTION INTRAMUSCULAR; INTRAVENOUS; SUBCUTANEOUS at 02:05

## 2023-06-09 RX ADMIN — OXYCODONE HYDROCHLORIDE 10 MILLIGRAM(S): 5 TABLET ORAL at 08:29

## 2023-06-09 RX ADMIN — Medication 975 MILLIGRAM(S): at 13:25

## 2023-06-09 RX ADMIN — HYDROMORPHONE HYDROCHLORIDE 0.5 MILLIGRAM(S): 2 INJECTION INTRAMUSCULAR; INTRAVENOUS; SUBCUTANEOUS at 20:39

## 2023-06-09 RX ADMIN — FINASTERIDE 5 MILLIGRAM(S): 5 TABLET, FILM COATED ORAL at 05:08

## 2023-06-09 RX ADMIN — HYDROMORPHONE HYDROCHLORIDE 0.5 MILLIGRAM(S): 2 INJECTION INTRAMUSCULAR; INTRAVENOUS; SUBCUTANEOUS at 05:03

## 2023-06-09 RX ADMIN — Medication 12.5 MILLIGRAM(S): at 17:54

## 2023-06-09 RX ADMIN — OXYCODONE HYDROCHLORIDE 10 MILLIGRAM(S): 5 TABLET ORAL at 09:25

## 2023-06-09 RX ADMIN — Medication 975 MILLIGRAM(S): at 12:27

## 2023-06-09 RX ADMIN — HYDROMORPHONE HYDROCHLORIDE 0.5 MILLIGRAM(S): 2 INJECTION INTRAMUSCULAR; INTRAVENOUS; SUBCUTANEOUS at 16:25

## 2023-06-09 RX ADMIN — OXYCODONE HYDROCHLORIDE 10 MILLIGRAM(S): 5 TABLET ORAL at 23:07

## 2023-06-09 RX ADMIN — HYDROMORPHONE HYDROCHLORIDE 0.5 MILLIGRAM(S): 2 INJECTION INTRAMUSCULAR; INTRAVENOUS; SUBCUTANEOUS at 01:50

## 2023-06-09 RX ADMIN — OXYCODONE HYDROCHLORIDE 10 MILLIGRAM(S): 5 TABLET ORAL at 13:37

## 2023-06-09 RX ADMIN — Medication 975 MILLIGRAM(S): at 05:06

## 2023-06-09 NOTE — PROGRESS NOTE ADULT - SUBJECTIVE AND OBJECTIVE BOX
Subjective: Patient seen at bedside, no current complaints, no overnight events, denies n/v/cp/sob.       MEDICATIONS  (STANDING):  acetaminophen     Tablet .. 975 milliGRAM(s) Oral every 6 hours  enoxaparin Injectable 40 milliGRAM(s) SubCutaneous every 24 hours  finasteride 5 milliGRAM(s) Oral every 24 hours  metoprolol tartrate 12.5 milliGRAM(s) Oral two times a day  midodrine 5 milliGRAM(s) Oral every 8 hours  polyethylene glycol 3350 17 Gram(s) Oral daily  senna 2 Tablet(s) Oral at bedtime    MEDICATIONS  (PRN):  HYDROmorphone  Injectable 0.5 milliGRAM(s) IV Push every 3 hours PRN Breakthrough pain  oxyCODONE    IR 5 milliGRAM(s) Oral every 4 hours PRN Moderate Pain (4 - 6)  oxyCODONE    IR 10 milliGRAM(s) Oral every 4 hours PRN Severe Pain (7 - 10)      Vital Signs Last 24 Hrs  T(C): 37.2 (09 Jun 2023 04:48), Max: 38.2 (08 Jun 2023 11:32)  T(F): 99 (09 Jun 2023 04:48), Max: 100.7 (08 Jun 2023 11:32)  HR: 95 (09 Jun 2023 04:48) (92 - 111)  BP: 152/82 (09 Jun 2023 04:48) (98/60 - 152/82)  BP(mean): 73 (08 Jun 2023 21:00) (73 - 86)  RR: 18 (09 Jun 2023 04:48) (13 - 22)  SpO2: 94% (09 Jun 2023 04:48) (90% - 99%)    Parameters below as of 08 Jun 2023 21:00  Patient On (Oxygen Delivery Method): nasal cannula  O2 Flow (L/min): 2      Physical Exam:    Constitutional: NAD  HEENT: PERRL, EOMI  Respiratory: Respirations non-labored, no accessory muscle use  Gastrointestinal: Soft, non-tender, non-distended  Neurological: A&O x 3  Extremities: RLE with dressing in place, right knee edematous, moving ankle/toes. Sensation intact        LABS:                        8.3    11.34 )-----------( 137      ( 08 Jun 2023 07:50 )             23.7     06-08    137  |  101  |  16.5  ----------------------------<  99  3.8   |  26.0  |  0.68    Ca    7.7<L>      08 Jun 2023 05:45  Phos  1.7     06-08  Mg     1.9     06-08        A: 68y Male with h/o HOCM admitted with R distal femur fx s/p IM nail    P:  - known HOCM, previous AKILAH showing hyperdynamic LV, mod-sev MR w/ JUSTINA, and prolapse of posterior MV leaflet.  -Echo this admission 60-65%, mod JUSTINA, mod-severe MR   - on verapamil as outpatient, cannot tolerate due to distributive shock currently  - c/w midodrine 5mg TID   - Repeat QTC from 6/8/2023 is 503, baseline QTC is in the 490s on 6/3  - metoprolol 12.5 mg BID for rate control.   - Avoid hypotension, hypovolemia.   - Recommend to transfuse if HgB <8.   - maintain adequate fluid hydration for preload, currently off thiazide diuretics which is his home medication   - outpatient evaluation for Mevacatem. May need MV replacement in the future.
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
## 2023-06-09 NOTE — PROGRESS NOTE ADULT - PROBLEM SELECTOR PLAN 1
- known HOCM, previous AKILAH showing hyperdynamic LV, mod-sev MR w/ JUSTINA, and prolapse of posterior MV leaflet.  -Echo this admission 60-65%, mod JUSTINA, mod-severe MR   - on verapamil as outpatient, cannot tolerate due to distributive shock currently.   - patient's BP is normotensive now, midodrine d/c'd. Continue to monitor BP.   - disopyramide d/c'd due to prolonged QTC (535)  - Repeat QTC from 6/8/2023 is 503.   - baseline QTC is in the 490s on 6/3  - c/w metoprolol 12.5 mg BID with BP/HR parameters for rate control.   - pt reports no side effects (diarrhea) from metoprolol as patient reports having diarrhea while on metoprolol for 1 month in the past.   - monitor for bradycardia and hypotension.   - Avoid hypotension, hypovolemia.   - Recommend to transfuse if HgB <8.   - maintain adequate fluid hydration for preload, currently off thiazide diuretics which is his home medication   - outpatient evaluation for Mevacatem. May need MV replacement in the future.

## 2023-06-09 NOTE — PROGRESS NOTE ADULT - SUBJECTIVE AND OBJECTIVE BOX
[facility]                                                         CARDIOLOGY AT [facility]                                                                  [address] [facility], [place] [place] [address]                                                         Telephone: [phone]. Fax:[phone]                                                                             PROGRESS NOTE    Reason for follow up: HOCM   Update: On telemetry, NSR/Sinus Tachyardia 90-low 100s. Patient states that he didn't have diarrhea with metoprolol overnight.     Review of symptoms:   Cardiac:  No chest pain. No dyspnea. No palpitations.  Respiratory: no cough. No dyspnea  Gastrointestinal: No diarrhea. No abdominal pain. No bleeding.   Neuro: No focal neuro complaints.    Vitals:  T(C): 37.1 (06-09-23 @ 08:00), Max: 38.2 (06-08-23 @ 11:32)  HR: 87 (06-09-23 @ 08:00) (87 - 111)  BP: 117/72 (06-09-23 @ 08:00) (98/60 - 152/82)  RR: 18 (06-09-23 @ 08:00) (18 - 22)  SpO2: 95% (06-09-23 @ 08:00) (93% - 99%)  Wt(kg): --  I&O's Summary    08 Jun 2023 07:01  -  09 Jun 2023 07:00  --------------------------------------------------------  IN: 394.9 mL / OUT: 1450 mL / NET: -1055.1 mL      Weight (kg): 85.3 (06-05 @ 12:46)    PHYSICAL EXAM:  Appearance: Comfortable. No acute distress  HEENT:  Atraumatic. Normocephalic.  Normal oral mucosa  Neurologic: A & O x 3, no gross focal deficits.  Cardiovascular: RRR S1 S2, No murmur, no rubs/gallops. No JVD  Respiratory: Lungs clear to auscultation, unlabored. 2L Nasal cannula  Gastrointestinal:  Soft, Non-tender, + BS  Lower Extremities: 2+ Peripheral Pulses, No clubbing, cyanosis, or edema  Psychiatry: Patient is calm. No agitation.   Skin: warm and dry.    CURRENT CARDIAC MEDICATIONS:  metoprolol tartrate 12.5 milliGRAM(s) Oral two times a day  midodrine 5 milliGRAM(s) Oral every 8 hours      CURRENT OTHER MEDICATIONS:  acetaminophen     Tablet .. 975 milliGRAM(s) Oral every 6 hours  HYDROmorphone  Injectable 0.5 milliGRAM(s) IV Push every 3 hours PRN Breakthrough pain  oxyCODONE    IR 5 milliGRAM(s) Oral every 4 hours PRN Moderate Pain (4 - 6)  oxyCODONE    IR 10 milliGRAM(s) Oral every 4 hours PRN Severe Pain (7 - 10)  polyethylene glycol 3350 17 Gram(s) Oral daily  senna 2 Tablet(s) Oral at bedtime  finasteride 5 milliGRAM(s) Oral every 24 hours  enoxaparin Injectable 40 milliGRAM(s) SubCutaneous every 24 hours      LABS:	 	  ( 07 Jun 2023 05:45 )  Troponin T  0.16<H>,  CPK  X    , CKMB  X    , BNP X        , ( 06 Jun 2023 21:20 )  Troponin T  0.16<H>,  CPK  X    , CKMB  X    , BNP X        , ( 06 Jun 2023 16:42 )  Troponin T  0.18<H>,  CPK  X    , CKMB  X    , BNP X                                  8.2    10.30 )-----------( 163      ( 09 Jun 2023 06:00 )             24.4     06-09    138  |  104  |  17.6  ----------------------------<  104<H>  4.0   |  24.0  |  0.67    Ca    7.8<L>      09 Jun 2023 06:00  Phos  2.6     06-09  Mg     2.1     06-09      PT/INR/PTT ( 05 Jun 2023 19:55 )                       :                       :      12.4         :       X                     .        .                   .              .           .       1.07        .                                       Lipid Profile:   HgA1c:   TSH:     TELEMETRY: NSR/Sinus tachycardia 90-low 110s.   ECG: NSR     DIAGNOSTIC TESTING:  [x] Echocardiogram: < from: TTE Echo Complete w/ Contrast w/ Doppler (06.05.23 @ 20:20) >   1. Technically difficult study.   2. Left ventricular ejection fraction, by visual estimation, is 60 to   65%.   3. Normal globalleft ventricular systolic function.   4. There is severe concentric left ventricular hypertrophy.   5. Hypertrophic CMP with LVOT max gradient 96 mmHg.   6. Severely enlarged left atrium.   7. Degenerative mitral valve.   8. Mild to moderate mitral annular calcification.   9. Moderate anterior leaflet systolic anterior motion of the mitral   valve.  10. Moderate to severe mitral valve regurgitation.  11. Prolapse of posterior leaflet of the mitral valve.  12. Mild tricuspid regurgitation.  13. Mild aortic regurgitation.  14. Sclerotic aortic valve with decreased opening.  15. Estimated pulmonary artery systolic pressure is 39.2 mmHg assuming a   right atrial pressure of 3 mmHg, which is consistent with borderline   pulmonary hypertension.  16. There is a large liver cyst 8.7cm x 9.12 cm, consider dedicated liver   imaging.    < end of copied text >    [ ]  Catheterization:  [ ] Stress Test:    OTHER: 	  < from: CT Angio Abd Aorta w/run-off w/ IV Cont (06.06.23 @ 03:33) >    No large vessel arterial injury is identified of the right lower   extremity.    Redemonstrated markedly comminuted and displaced distal right femur   fracture with intra-articular extension, patellar fracture, and   anterolateral proximal right tibia osteophyte fracture. Fractures are   better characterized on dedicated recent right knee CT from 6/3/2023.    Extensive soft tissue swelling throughout the right thigh and about the   right knee with subcutaneous fat stranding and stranding of the   intermuscular fat planes. Right knee complex joint effusion with possible   lipohemarthrosis. Asymmetric enlargement of the right thigh musculature   from presumed intramuscular hemorrhage. Again, no contrast extravasation   is seen to suggest large vessel active hemorrhage at the time of imaging.   Correlate for any signs and symptoms of lower extremity soft tissue   infection versus compartment syndrome.    Nonaneurysmal abdominal aorta with shallow left infrarenal penetrating   ulcer measuring 4 mm depth. Consider follow-up imaging in 6-12 months to   evaluate for stability.    < end of copied text >    < from: Xray Chest 1 View AP/PA. (06.06.23 @ 02:42) >    IMPRESSION: No acute finding or change.    < end of copied text >

## 2023-06-09 NOTE — PROGRESS NOTE ADULT - NS ATTEND AMEND GEN_ALL_CORE FT
seen with above,    68M history significant for HTN, HLD, nonobstructive CAD (by CCTA with LAD 50%, OM 40%), HOCM with JUSTINA/severe MR was following with [facility] with Dr. Hackett and Dr. Antonio, reports intolerant to metoprolol with diarrhea, mechanical fall with ladder at home with R-distal femoral fracture with severe acute blood loss anemia Hb cole 6.2, monitoring in SICU for fluid hydration and vasopressor, -Echo with preserved LV EF with LVOT gradient of 90 mmHg, went to OR yesterday s/p ORIF  -since postop with HR 100s likely due to pain and SBP 90-100s was on phenylephrine not able to tolerate verapamil, added midodrine 5mg TID since off vasopressor, given BP cannot tolerate BB or CCB attempted disopyramide  on 6/3 increased to 530s cannot tolerate disopyramide use.   -rechallenged with low dose metoprolol 12.5mg BID BP stable, will stop midodrine, continue to uptitrate metoprolol dose as BP tolerate, goal HR ~60s  -euvolemic, no signs of HF or fluid overload   -transfuse to keep Hb >8  -low grade fever 100.7 and recurrent 100.5 continue to monitor   -OOT/ambulate, elevated risk for VTE, eventual rehab placement   -outpatient evaluation for Mevacatem, and may need MV replacement in the future.         Wei Poe DO, [facility]  Faculty Non-Invasive Cardiologist  [phone].

## 2023-06-10 LAB
BASOPHILS # BLD AUTO: 0.03 K/UL — SIGNIFICANT CHANGE UP (ref 0–0.2)
BASOPHILS NFR BLD AUTO: 0.3 % — SIGNIFICANT CHANGE UP (ref 0–2)
EOSINOPHIL # BLD AUTO: 0.17 K/UL — SIGNIFICANT CHANGE UP (ref 0–0.5)
EOSINOPHIL NFR BLD AUTO: 1.6 % — SIGNIFICANT CHANGE UP (ref 0–6)
HCT VFR BLD CALC: 25.7 % — LOW (ref 39–50)
HGB BLD-MCNC: 8.2 G/DL — LOW (ref 13–17)
IMM GRANULOCYTES NFR BLD AUTO: 0.8 % — SIGNIFICANT CHANGE UP (ref 0–0.9)
LYMPHOCYTES # BLD AUTO: 0.54 K/UL — LOW (ref 1–3.3)
LYMPHOCYTES # BLD AUTO: 5.2 % — LOW (ref 13–44)
MCHC RBC-ENTMCNC: 28.9 PG — SIGNIFICANT CHANGE UP (ref 27–34)
MCHC RBC-ENTMCNC: 31.9 GM/DL — LOW (ref 32–36)
MCV RBC AUTO: 90.5 FL — SIGNIFICANT CHANGE UP (ref 80–100)
MONOCYTES # BLD AUTO: 0.83 K/UL — SIGNIFICANT CHANGE UP (ref 0–0.9)
MONOCYTES NFR BLD AUTO: 8 % — SIGNIFICANT CHANGE UP (ref 2–14)
NEUTROPHILS # BLD AUTO: 8.74 K/UL — HIGH (ref 1.8–7.4)
NEUTROPHILS NFR BLD AUTO: 84.1 % — HIGH (ref 43–77)
PLATELET # BLD AUTO: 206 K/UL — SIGNIFICANT CHANGE UP (ref 150–400)
RBC # BLD: 2.84 M/UL — LOW (ref 4.2–5.8)
RBC # FLD: 14.7 % — HIGH (ref 10.3–14.5)
SARS-COV-2 RNA SPEC QL NAA+PROBE: SIGNIFICANT CHANGE UP
WBC # BLD: 10.39 K/UL — SIGNIFICANT CHANGE UP (ref 3.8–10.5)
WBC # FLD AUTO: 10.39 K/UL — SIGNIFICANT CHANGE UP (ref 3.8–10.5)

## 2023-06-10 PROCEDURE — 99232 SBSQ HOSP IP/OBS MODERATE 35: CPT

## 2023-06-10 PROCEDURE — 99231 SBSQ HOSP IP/OBS SF/LOW 25: CPT

## 2023-06-10 RX ORDER — ACETAMINOPHEN 500 MG
1000 TABLET ORAL ONCE
Refills: 0 | Status: COMPLETED | OUTPATIENT
Start: 2023-06-10 | End: 2023-06-10

## 2023-06-10 RX ORDER — METOPROLOL TARTRATE 50 MG
25 TABLET ORAL EVERY 8 HOURS
Refills: 0 | Status: DISCONTINUED | OUTPATIENT
Start: 2023-06-10 | End: 2023-06-11

## 2023-06-10 RX ORDER — POLYETHYLENE GLYCOL 3350 17 G/17G
17 POWDER, FOR SOLUTION ORAL
Qty: 0 | Refills: 0 | DISCHARGE
Start: 2023-06-10

## 2023-06-10 RX ORDER — SENNA PLUS 8.6 MG/1
2 TABLET ORAL
Qty: 0 | Refills: 0 | DISCHARGE
Start: 2023-06-10

## 2023-06-10 RX ADMIN — Medication 975 MILLIGRAM(S): at 17:40

## 2023-06-10 RX ADMIN — HYDROMORPHONE HYDROCHLORIDE 0.5 MILLIGRAM(S): 2 INJECTION INTRAMUSCULAR; INTRAVENOUS; SUBCUTANEOUS at 16:02

## 2023-06-10 RX ADMIN — OXYCODONE HYDROCHLORIDE 5 MILLIGRAM(S): 5 TABLET ORAL at 04:04

## 2023-06-10 RX ADMIN — HYDROMORPHONE HYDROCHLORIDE 0.5 MILLIGRAM(S): 2 INJECTION INTRAMUSCULAR; INTRAVENOUS; SUBCUTANEOUS at 21:32

## 2023-06-10 RX ADMIN — HYDROMORPHONE HYDROCHLORIDE 0.5 MILLIGRAM(S): 2 INJECTION INTRAMUSCULAR; INTRAVENOUS; SUBCUTANEOUS at 09:38

## 2023-06-10 RX ADMIN — OXYCODONE HYDROCHLORIDE 10 MILLIGRAM(S): 5 TABLET ORAL at 09:00

## 2023-06-10 RX ADMIN — Medication 400 MILLIGRAM(S): at 23:56

## 2023-06-10 RX ADMIN — HYDROMORPHONE HYDROCHLORIDE 0.5 MILLIGRAM(S): 2 INJECTION INTRAMUSCULAR; INTRAVENOUS; SUBCUTANEOUS at 10:00

## 2023-06-10 RX ADMIN — Medication 975 MILLIGRAM(S): at 18:20

## 2023-06-10 RX ADMIN — OXYCODONE HYDROCHLORIDE 10 MILLIGRAM(S): 5 TABLET ORAL at 08:26

## 2023-06-10 RX ADMIN — OXYCODONE HYDROCHLORIDE 10 MILLIGRAM(S): 5 TABLET ORAL at 13:56

## 2023-06-10 RX ADMIN — Medication 975 MILLIGRAM(S): at 12:04

## 2023-06-10 RX ADMIN — Medication 975 MILLIGRAM(S): at 12:30

## 2023-06-10 RX ADMIN — Medication 975 MILLIGRAM(S): at 06:33

## 2023-06-10 RX ADMIN — Medication 25 MILLIGRAM(S): at 13:56

## 2023-06-10 RX ADMIN — OXYCODONE HYDROCHLORIDE 10 MILLIGRAM(S): 5 TABLET ORAL at 23:46

## 2023-06-10 RX ADMIN — OXYCODONE HYDROCHLORIDE 10 MILLIGRAM(S): 5 TABLET ORAL at 14:30

## 2023-06-10 RX ADMIN — FINASTERIDE 5 MILLIGRAM(S): 5 TABLET, FILM COATED ORAL at 06:03

## 2023-06-10 RX ADMIN — Medication 975 MILLIGRAM(S): at 06:03

## 2023-06-10 RX ADMIN — HYDROMORPHONE HYDROCHLORIDE 0.5 MILLIGRAM(S): 2 INJECTION INTRAMUSCULAR; INTRAVENOUS; SUBCUTANEOUS at 15:31

## 2023-06-10 RX ADMIN — Medication 12.5 MILLIGRAM(S): at 06:02

## 2023-06-10 RX ADMIN — ENOXAPARIN SODIUM 40 MILLIGRAM(S): 100 INJECTION SUBCUTANEOUS at 06:01

## 2023-06-10 RX ADMIN — OXYCODONE HYDROCHLORIDE 10 MILLIGRAM(S): 5 TABLET ORAL at 19:00

## 2023-06-10 RX ADMIN — OXYCODONE HYDROCHLORIDE 10 MILLIGRAM(S): 5 TABLET ORAL at 18:48

## 2023-06-10 RX ADMIN — Medication 25 MILLIGRAM(S): at 21:33

## 2023-06-10 NOTE — PROGRESS NOTE ADULT - PROBLEM SELECTOR PLAN 1
-Echo this admission 60-65%, mod JUSTINA, mod-severe MR   -On verapamil as outpatient, cannot tolerate due to distributive shock. Failed disopyramide dosing with prolonged QTC  -C/w metoprolol. Dose / frequency increased 2/2 tachy  -Outpatient follow up for MR and mevacatem    -Signing off

## 2023-06-10 NOTE — PROGRESS NOTE ADULT - NS ATTEND AMEND GEN_ALL_CORE FT
seen with above,    68M history significant for HTN, HLD, nonobstructive CAD (by CCTA with LAD 50%, OM 40%), HOCM with JUSTINA/severe MR was following with [facility] with Dr. Hackett and Dr. Antonio, reports intolerant to metoprolol with diarrhea, mechanical fall with ladder at home with R-distal femoral fracture with severe acute blood loss anemia Hb cole 6.2, monitoring in SICU for fluid hydration and vasopressor, -Echo with preserved LV EF with LVOT gradient of 90 mmHg, went to OR yesterday s/p ORIF  -since postop with HR 100s likely due to pain and SBP 90-100s was on phenylephrine not able to tolerate verapamil, added midodrine 5mg TID since off vasopressor, given BP cannot tolerate BB or CCB attempted disopyramide  on 6/3 increased to 530s cannot tolerate disopyramide use.   -rechallenged with low dose metoprolol 12.5mg BID BP stable, BP stable off midodrine, continue to uptitrate metoprolol dose increase to 25mg q8hrs, if stable then change to 50mg BID tomorrow, ideal goal HR ~60s  -need better pain control   -euvolemic, no signs of HF or fluid overload   -transfuse to keep Hb >8  -low grade fever continue to monitor   -OOT/ambulate, elevated risk for VTE, eventual rehab placement   -outpatient evaluation for Mevacatem, and may need MV replacement in the future.   -reconsult if new cardiac issue arise       Wei Poe DO, [facility]  Faculty Non-Invasive Cardiologist  [phone].

## 2023-06-10 NOTE — PROGRESS NOTE ADULT - TIME BILLING
HOCM, Acute anemia
GENIE
HOCM, Acute blood loss anemia
HOCM, postp anemia
Preop. cardiac risk eval. HOCM

## 2023-06-10 NOTE — PROGRESS NOTE ADULT - SUBJECTIVE AND OBJECTIVE BOX
[facility]                                                         CARDIOLOGY AT [facility]                                                                  [address], [place] [address]                                                         Telephone: [phone]. Fax:[phone]                                                                             PROGRESS NOTE    Reason for follow up: HOCM management  Update: some tachy episodes, BB increased       Review of symptoms:   Cardiac:  No chest pain. No dyspnea. No palpitations.  Respiratory: no cough. No dyspnea  Gastrointestinal: No diarrhea. No abdominal pain. No bleeding.   Neuro: No focal neuro complaints.    Vitals:  T(C): 36.8 (06-10-23 @ 08:30), Max: 37.7 (06-10-23 @ 04:00)  HR: 97 (06-10-23 @ 08:30) (90 - 104)  BP: 118/72 (06-10-23 @ 08:30) (118/72 - 149/87)  RR: 19 (06-10-23 @ 08:30) (18 - 19)  SpO2: 98% (06-10-23 @ 08:30) (94% - 100%)  Wt(kg): --  I&O's Summary    09 Jun 2023 07:01  -  10 Ang 2023 07:00  --------------------------------------------------------  IN: 0 mL / OUT: 450 mL / NET: -450 mL      Weight (kg): 85.3 (06-05 @ 12:46)    PHYSICAL EXAM:  Appearance: Comfortable. No acute distress  HEENT:  Atraumatic. Normocephalic.  Normal oral mucosa  Neurologic: A & O x 3, no gross focal deficits.  Cardiovascular: RRR S1 S2, No murmur, no rubs/gallops. No JVD  Respiratory: Lungs clear to auscultation, unlabored   Gastrointestinal:  Soft, Non-tender, + BS  Lower Extremities: 2+ Peripheral Pulses, No clubbing, cyanosis, or edema  Psychiatry: Patient is calm. No agitation.   Skin: warm and dry.    CURRENT CARDIAC MEDICATIONS:  metoprolol tartrate 25 milliGRAM(s) Oral every 8 hours      CURRENT OTHER MEDICATIONS:  acetaminophen     Tablet .. 975 milliGRAM(s) Oral every 6 hours  HYDROmorphone  Injectable 0.5 milliGRAM(s) IV Push every 3 hours PRN Breakthrough pain  oxyCODONE    IR 5 milliGRAM(s) Oral every 4 hours PRN Moderate Pain (4 - 6)  oxyCODONE    IR 10 milliGRAM(s) Oral every 4 hours PRN Severe Pain (7 - 10)  polyethylene glycol 3350 17 Gram(s) Oral daily  senna 2 Tablet(s) Oral at bedtime  finasteride 5 milliGRAM(s) Oral every 24 hours  enoxaparin Injectable 40 milliGRAM(s) SubCutaneous every 24 hours      LABS:	 	  ( 07 Jun 2023 05:45 )  Troponin T  0.16<H>,  CPK  X    , CKMB  X    , BNP X        , ( 06 Jun 2023 21:20 )  Troponin T  0.16<H>,  CPK  X    , CKMB  X    , BNP X        , ( 06 Jun 2023 16:42 )  Troponin T  0.18<H>,  CPK  X    , CKMB  X    , BNP X                                  8.2    10.39 )-----------( 206      ( 10 Ang 2023 07:37 )             25.7     06-09    138  |  104  |  17.6  ----------------------------<  104<H>  4.0   |  24.0  |  0.67    Ca    7.8<L>      09 Jun 2023 06:00  Phos  2.6     06-09  Mg     2.1     06-09      PT/INR/PTT ( 05 Jun 2023 19:55 )                       :                       :      12.4         :       X                     .        .                   .              .           .       1.07        .                                       Lipid Profile:   HgA1c:   TSH:     TELEMETRY: ST / SR      DIAGNOSTIC TESTING:  [ ] Echocardiogram:     < from: TTE Echo Complete w/ Contrast w/ Doppler (06.05.23 @ 20:20) >  PHYSICIAN INTERPRETATION:  Left Ventricle: The left ventricular internal cavity size is normal.  Global LV systolic function was normal. Left ventricular ejection   fraction, by visual estimation, is 60 to 65%. Hypertrophic CMP with LVOT   max gradient 96 mmHg.  Right Ventricle: The right ventricular size is normal. RV systolic   function is normal.  Left Atrium: Severely enlarged left atrium.  Right Atrium: The right atrium is normal in size.  Pericardium: There is no evidence of pericardial effusion. There is a   significant pericardial fat pad present.  Mitral Valve: The mitral valve is degenerative in appearance. There is   mild to moderate mitral annular calcification. Moderate systolic anterior   motion of the anterior leaflet of the mitral valve. Moderate to severe   mitral valve regurgitation is seen. The MR jet is eccentric anteriorly   directed. Prolapse of posterior leaflet of the mitral valve.  Tricuspid Valve: The tricuspid valve is normal in structure. Mild   tricuspid regurgitation is visualized. Estimated pulmonary artery   systolic pressure is 39.2 mmHg assuming a right atrial pressure of 3   mmHg, which is consistent with borderline pulmonary hypertension.  Aortic Valve: The aortic valve is trileaflet. Sclerotic aortic valve with   decreased opening. Mild aortic valve regurgitation is seen.  Pulmonic Valve: The pulmonic valve was not well visualized. Trace   pulmonic valve regurgitation.  Aorta: The aortic root is normal in size and structure.  Pulmonary Artery: The pulmonary artery is of normal size and origin.  Venous: The inferior vena cava was normal sized, with respiratory size   variation greater than 50%.  In comparisonto the previous echocardiogram(s): There are no prior   studies on this patient for comparison purposes.    < end of copied text >

## 2023-06-10 NOTE — PROGRESS NOTE ADULT - SUBJECTIVE AND OBJECTIVE BOX
Subjective: Patient seen at bedside, no current complaints, no overnight events, denies n/v/cp/sob. Patient states he is feeling much better and has been able to get from bed to chair      MEDICATIONS  (STANDING):  acetaminophen     Tablet .. 975 milliGRAM(s) Oral every 6 hours  enoxaparin Injectable 40 milliGRAM(s) SubCutaneous every 24 hours  finasteride 5 milliGRAM(s) Oral every 24 hours  metoprolol tartrate 12.5 milliGRAM(s) Oral two times a day  polyethylene glycol 3350 17 Gram(s) Oral daily  senna 2 Tablet(s) Oral at bedtime    MEDICATIONS  (PRN):  HYDROmorphone  Injectable 0.5 milliGRAM(s) IV Push every 3 hours PRN Breakthrough pain  oxyCODONE    IR 10 milliGRAM(s) Oral every 4 hours PRN Severe Pain (7 - 10)  oxyCODONE    IR 5 milliGRAM(s) Oral every 4 hours PRN Moderate Pain (4 - 6)      Vital Signs Last 24 Hrs  T(C): 37.6 (10 Ang 2023 00:00), Max: 37.6 (09 Jun 2023 12:00)  T(F): 99.6 (10 Ang 2023 00:00), Max: 99.7 (09 Jun 2023 12:00)  HR: 97 (10 Ang 2023 00:00) (87 - 104)  BP: 137/83 (10 Ang 2023 00:00) (117/72 - 152/82)  BP(mean): --  RR: 18 (10 Ang 2023 00:00) (18 - 18)  SpO2: 94% (10 Ang 2023 00:00) (94% - 100%)    Parameters below as of 10 Ang 2023 00:00  Patient On (Oxygen Delivery Method): room air        Physical Exam:    Constitutional: NAD  HEENT: PERRL, EOMI  Respiratory: Respirations non-labored, no accessory muscle use  Gastrointestinal: Soft, non-tender, non-distended  Neurological: A&O x 3  Extremities: RLE with dressing in place, right knee edematous, moving ankle/toes. Sensation intact    LABS:                        8.2    10.30 )-----------( 163      ( 09 Jun 2023 06:00 )             24.4     06-09    138  |  104  |  17.6  ----------------------------<  104<H>  4.0   |  24.0  |  0.67    Ca    7.8<L>      09 Jun 2023 06:00  Phos  2.6     06-09  Mg     2.1     06-09            A: 68y Male with h/o HOCM admitted with R distal femur fx s/p IM nail    P:  - known HOCM, previous AKILAH showing hyperdynamic LV, mod-sev MR w/ JUSTINA, and prolapse of posterior MV leaflet.  -Echo this admission 60-65%, mod JUSTINA, mod-severe MR   - on verapamil as outpatient, cannot tolerate due to distributive shock currently  - c/w midodrine 5mg TID   - Repeat QTC from 6/8/2023 is 503, baseline QTC is in the 490s on 6/3  - metoprolol 12.5 mg BID for rate control.   - Avoid hypotension, hypovolemia.   - Recommend to transfuse if HgB <8.   - maintain adequate fluid hydration for preload, currently off thiazide diuretics which is his home medication   - outpatient evaluation for Mevacatem. May need MV replacement in the future.

## 2023-06-10 NOTE — PROGRESS NOTE ADULT - PROBLEM SELECTOR PROBLEM 1
HOCM (hypertrophic obstructive cardiomyopathy)

## 2023-06-10 NOTE — PROGRESS NOTE ADULT - NS ATTEND AMEND GEN_ALL_CORE FT
Agree with above assessment.  The patient was seen and examined by myself with the surgical PA.  The patient is with no new complaints or events overnight.  He is concerned still about pain control and not having strong enough medications when he is discharged.  The pain is manageable now, right thigh dressings in place.  The patient is trauma stable for discharge planning.

## 2023-06-10 NOTE — PROGRESS NOTE ADULT - ASSESSMENT
68M PMH CAD (CCTA LAD 50%, OM 40%), HTN, HLD, HOCM, ?pAF presenting s/p fall from 10ft on ladder, right femur fx. Cardiology following for HOCM management pre and post op. S/p pressors pre / post op. Now with some tachy episodes, BB adjusted

## 2023-06-11 PROCEDURE — 99231 SBSQ HOSP IP/OBS SF/LOW 25: CPT

## 2023-06-11 RX ORDER — METOPROLOL TARTRATE 50 MG
1 TABLET ORAL
Qty: 0 | Refills: 0 | DISCHARGE
Start: 2023-06-11

## 2023-06-11 RX ORDER — METOPROLOL TARTRATE 50 MG
50 TABLET ORAL
Refills: 0 | Status: DISCONTINUED | OUTPATIENT
Start: 2023-06-11 | End: 2023-06-12

## 2023-06-11 RX ORDER — ENOXAPARIN SODIUM 100 MG/ML
40 INJECTION SUBCUTANEOUS
Qty: 0 | Refills: 0 | DISCHARGE
Start: 2023-06-11

## 2023-06-11 RX ADMIN — Medication 50 MILLIGRAM(S): at 17:33

## 2023-06-11 RX ADMIN — HYDROMORPHONE HYDROCHLORIDE 0.5 MILLIGRAM(S): 2 INJECTION INTRAMUSCULAR; INTRAVENOUS; SUBCUTANEOUS at 21:30

## 2023-06-11 RX ADMIN — Medication 975 MILLIGRAM(S): at 23:18

## 2023-06-11 RX ADMIN — OXYCODONE HYDROCHLORIDE 10 MILLIGRAM(S): 5 TABLET ORAL at 09:25

## 2023-06-11 RX ADMIN — Medication 975 MILLIGRAM(S): at 17:33

## 2023-06-11 RX ADMIN — OXYCODONE HYDROCHLORIDE 10 MILLIGRAM(S): 5 TABLET ORAL at 02:40

## 2023-06-11 RX ADMIN — HYDROMORPHONE HYDROCHLORIDE 0.5 MILLIGRAM(S): 2 INJECTION INTRAMUSCULAR; INTRAVENOUS; SUBCUTANEOUS at 13:00

## 2023-06-11 RX ADMIN — FINASTERIDE 5 MILLIGRAM(S): 5 TABLET, FILM COATED ORAL at 05:46

## 2023-06-11 RX ADMIN — OXYCODONE HYDROCHLORIDE 10 MILLIGRAM(S): 5 TABLET ORAL at 16:12

## 2023-06-11 RX ADMIN — HYDROMORPHONE HYDROCHLORIDE 0.5 MILLIGRAM(S): 2 INJECTION INTRAMUSCULAR; INTRAVENOUS; SUBCUTANEOUS at 12:26

## 2023-06-11 RX ADMIN — HYDROMORPHONE HYDROCHLORIDE 0.5 MILLIGRAM(S): 2 INJECTION INTRAMUSCULAR; INTRAVENOUS; SUBCUTANEOUS at 23:02

## 2023-06-11 RX ADMIN — Medication 1000 MILLIGRAM(S): at 02:40

## 2023-06-11 RX ADMIN — HYDROMORPHONE HYDROCHLORIDE 0.5 MILLIGRAM(S): 2 INJECTION INTRAMUSCULAR; INTRAVENOUS; SUBCUTANEOUS at 07:16

## 2023-06-11 RX ADMIN — Medication 975 MILLIGRAM(S): at 11:25

## 2023-06-11 RX ADMIN — Medication 975 MILLIGRAM(S): at 07:16

## 2023-06-11 RX ADMIN — OXYCODONE HYDROCHLORIDE 10 MILLIGRAM(S): 5 TABLET ORAL at 23:18

## 2023-06-11 RX ADMIN — Medication 975 MILLIGRAM(S): at 05:46

## 2023-06-11 RX ADMIN — ENOXAPARIN SODIUM 40 MILLIGRAM(S): 100 INJECTION SUBCUTANEOUS at 05:46

## 2023-06-11 RX ADMIN — HYDROMORPHONE HYDROCHLORIDE 0.5 MILLIGRAM(S): 2 INJECTION INTRAMUSCULAR; INTRAVENOUS; SUBCUTANEOUS at 02:40

## 2023-06-11 RX ADMIN — HYDROMORPHONE HYDROCHLORIDE 0.5 MILLIGRAM(S): 2 INJECTION INTRAMUSCULAR; INTRAVENOUS; SUBCUTANEOUS at 04:45

## 2023-06-11 RX ADMIN — OXYCODONE HYDROCHLORIDE 10 MILLIGRAM(S): 5 TABLET ORAL at 08:25

## 2023-06-11 RX ADMIN — Medication 25 MILLIGRAM(S): at 05:47

## 2023-06-11 NOTE — PROGRESS NOTE ADULT - SUBJECTIVE AND OBJECTIVE BOX
Subjective: Patient seen at bedside, is having pain in right leg, medications given, denies n/v/cp/sob. Metoprolol dose increased by cardiology yesterday, had short run of SVTs overnight which was asymptomatic. Patient also requesting pain management consult      MEDICATIONS  (STANDING):  acetaminophen     Tablet .. 975 milliGRAM(s) Oral every 6 hours  enoxaparin Injectable 40 milliGRAM(s) SubCutaneous every 24 hours  finasteride 5 milliGRAM(s) Oral every 24 hours  metoprolol tartrate 25 milliGRAM(s) Oral every 8 hours  polyethylene glycol 3350 17 Gram(s) Oral daily  senna 2 Tablet(s) Oral at bedtime    MEDICATIONS  (PRN):  HYDROmorphone  Injectable 0.5 milliGRAM(s) IV Push every 3 hours PRN Breakthrough pain  oxyCODONE    IR 5 milliGRAM(s) Oral every 4 hours PRN Moderate Pain (4 - 6)  oxyCODONE    IR 10 milliGRAM(s) Oral every 4 hours PRN Severe Pain (7 - 10)      Vital Signs Last 24 Hrs  T(C): 37.3 (11 Jun 2023 00:09), Max: 37.6 (10 Ang 2023 20:00)  T(F): 99.1 (11 Jun 2023 00:09), Max: 99.6 (10 Ang 2023 20:00)  HR: 89 (11 Jun 2023 00:09) (85 - 102)  BP: 132/80 (11 Jun 2023 00:09) (109/68 - 145/71)  BP(mean): 85 (10 Ang 2023 20:00) (85 - 85)  RR: 18 (11 Jun 2023 00:09) (18 - 20)  SpO2: 93% (11 Jun 2023 00:09) (93% - 98%)    Parameters below as of 11 Jun 2023 00:09  Patient On (Oxygen Delivery Method): room air        Physical Exam:    Constitutional: NAD  HEENT: PERRL, EOMI  Respiratory: Respirations non-labored, no accessory muscle use  Gastrointestinal: Soft, non-tender, non-distended  Neurological: A&O x 3  Extremities: RLE with dressing in place, right knee edematous, moving ankle/toes. Sensation intact        LABS:                        8.2    10.39 )-----------( 206      ( 10 Ang 2023 07:37 )             25.7     06-09    138  |  104  |  17.6  ----------------------------<  104<H>  4.0   |  24.0  |  0.67    Ca    7.8<L>      09 Jun 2023 06:00  Phos  2.6     06-09  Mg     2.1     06-09      A: 68y Male with h/o HOCM admitted with R distal femur fx s/p IM nail    P:  -On verapamil as outpatient, cannot tolerate due to distributive shock. Failed disopyramide dosing with prolonged QTC  -C/w metoprolol. Dose / frequency increased per cardiology 2/2 tachy  -Outpatient follow up for MR and mevacatem  - Avoid hypotension, hypovolemia.   - Recommend to transfuse if HgB <8.   - maintain adequate fluid hydration for preload, currently off thiazide diuretics which is his home medication

## 2023-06-11 NOTE — PROGRESS NOTE ADULT - NS ATTEND OPT1 GEN_ALL_CORE
I attest my time as attending is greater than 50% of the total combined time spent on qualifying patient care activities by the PA/NP and attending.
I independently performed the documented:

## 2023-06-11 NOTE — PROGRESS NOTE ADULT - NS ATTEND AMEND GEN_ALL_CORE FT
Agree with above assessment.  The patient was seen and examined by myself with the surgical PA.  The patient is with no complaints. He states he was able to more easily mobilize yesterday with little pain.  The right thigh dressings intact and clean.  Continue with pain control, PT, trauma stable for discharge planning.

## 2023-06-12 ENCOUNTER — EMERGENCY (EMERGENCY)
Facility: HOSPITAL | Age: 68
LOS: 1 days | Discharge: HOME | End: 2023-06-12
Attending: EMERGENCY MEDICINE
Payer: MEDICARE

## 2023-06-12 ENCOUNTER — TRANSCRIPTION ENCOUNTER (OUTPATIENT)
Age: 68
End: 2023-06-12

## 2023-06-12 VITALS
SYSTOLIC BLOOD PRESSURE: 121 MMHG | OXYGEN SATURATION: 95 % | DIASTOLIC BLOOD PRESSURE: 75 MMHG | RESPIRATION RATE: 20 BRPM | HEART RATE: 94 BPM | TEMPERATURE: 98 F

## 2023-06-12 VITALS
OXYGEN SATURATION: 97 % | SYSTOLIC BLOOD PRESSURE: 135 MMHG | WEIGHT: 160.06 LBS | TEMPERATURE: 100 F | HEART RATE: 105 BPM | HEIGHT: 68 IN | RESPIRATION RATE: 16 BRPM | DIASTOLIC BLOOD PRESSURE: 91 MMHG

## 2023-06-12 DIAGNOSIS — K08.409 PARTIAL LOSS OF TEETH, UNSPECIFIED CAUSE, UNSPECIFIED CLASS: Chronic | ICD-10-CM

## 2023-06-12 DIAGNOSIS — Z98.890 OTHER SPECIFIED POSTPROCEDURAL STATES: Chronic | ICD-10-CM

## 2023-06-12 DIAGNOSIS — Z93.3 COLOSTOMY STATUS: Chronic | ICD-10-CM

## 2023-06-12 DIAGNOSIS — Z90.49 ACQUIRED ABSENCE OF OTHER SPECIFIED PARTS OF DIGESTIVE TRACT: Chronic | ICD-10-CM

## 2023-06-12 PROCEDURE — 85384 FIBRINOGEN ACTIVITY: CPT

## 2023-06-12 PROCEDURE — 85610 PROTHROMBIN TIME: CPT

## 2023-06-12 PROCEDURE — 87641 MR-STAPH DNA AMP PROBE: CPT

## 2023-06-12 PROCEDURE — 86900 BLOOD TYPING SEROLOGIC ABO: CPT

## 2023-06-12 PROCEDURE — P9016: CPT

## 2023-06-12 PROCEDURE — G0378: CPT

## 2023-06-12 PROCEDURE — 85730 THROMBOPLASTIN TIME PARTIAL: CPT

## 2023-06-12 PROCEDURE — 73700 CT LOWER EXTREMITY W/O DYE: CPT | Mod: MG

## 2023-06-12 PROCEDURE — 80048 BASIC METABOLIC PNL TOTAL CA: CPT

## 2023-06-12 PROCEDURE — C1769: CPT

## 2023-06-12 PROCEDURE — 86901 BLOOD TYPING SEROLOGIC RH(D): CPT

## 2023-06-12 PROCEDURE — 99285 EMERGENCY DEPT VISIT HI MDM: CPT

## 2023-06-12 PROCEDURE — 71045 X-RAY EXAM CHEST 1 VIEW: CPT

## 2023-06-12 PROCEDURE — 80053 COMPREHEN METABOLIC PANEL: CPT

## 2023-06-12 PROCEDURE — 86803 HEPATITIS C AB TEST: CPT

## 2023-06-12 PROCEDURE — 72170 X-RAY EXAM OF PELVIS: CPT

## 2023-06-12 PROCEDURE — 85025 COMPLETE CBC W/AUTO DIFF WBC: CPT

## 2023-06-12 PROCEDURE — 93005 ELECTROCARDIOGRAM TRACING: CPT

## 2023-06-12 PROCEDURE — 36415 COLL VENOUS BLD VENIPUNCTURE: CPT

## 2023-06-12 PROCEDURE — 86850 RBC ANTIBODY SCREEN: CPT

## 2023-06-12 PROCEDURE — 86923 COMPATIBILITY TEST ELECTRIC: CPT

## 2023-06-12 PROCEDURE — 87640 STAPH A DNA AMP PROBE: CPT

## 2023-06-12 PROCEDURE — C1713: CPT

## 2023-06-12 PROCEDURE — 99285 EMERGENCY DEPT VISIT HI MDM: CPT | Mod: 25

## 2023-06-12 PROCEDURE — 96374 THER/PROPH/DIAG INJ IV PUSH: CPT

## 2023-06-12 PROCEDURE — 84484 ASSAY OF TROPONIN QUANT: CPT

## 2023-06-12 PROCEDURE — 36430 TRANSFUSION BLD/BLD COMPNT: CPT

## 2023-06-12 PROCEDURE — 87635 SARS-COV-2 COVID-19 AMP PRB: CPT

## 2023-06-12 PROCEDURE — G1004: CPT

## 2023-06-12 PROCEDURE — 83735 ASSAY OF MAGNESIUM: CPT

## 2023-06-12 PROCEDURE — 85027 COMPLETE CBC AUTOMATED: CPT

## 2023-06-12 PROCEDURE — P9040: CPT

## 2023-06-12 PROCEDURE — 75635 CT ANGIO ABDOMINAL ARTERIES: CPT

## 2023-06-12 PROCEDURE — C1889: CPT

## 2023-06-12 PROCEDURE — 96376 TX/PRO/DX INJ SAME DRUG ADON: CPT

## 2023-06-12 PROCEDURE — C8929: CPT

## 2023-06-12 PROCEDURE — 73562 X-RAY EXAM OF KNEE 3: CPT

## 2023-06-12 PROCEDURE — 84100 ASSAY OF PHOSPHORUS: CPT

## 2023-06-12 PROCEDURE — 96375 TX/PRO/DX INJ NEW DRUG ADDON: CPT

## 2023-06-12 PROCEDURE — C9399: CPT

## 2023-06-12 PROCEDURE — P9045: CPT

## 2023-06-12 PROCEDURE — 76000 FLUOROSCOPY <1 HR PHYS/QHP: CPT

## 2023-06-12 PROCEDURE — 96360 HYDRATION IV INFUSION INIT: CPT | Mod: XU

## 2023-06-12 RX ORDER — OXYCODONE HYDROCHLORIDE 5 MG/1
1 TABLET ORAL
Qty: 0 | Refills: 0 | DISCHARGE
Start: 2023-06-12

## 2023-06-12 RX ORDER — GABAPENTIN 400 MG/1
1 CAPSULE ORAL
Qty: 0 | Refills: 0 | DISCHARGE
Start: 2023-06-12

## 2023-06-12 RX ORDER — GABAPENTIN 400 MG/1
400 CAPSULE ORAL AT BEDTIME
Refills: 0 | Status: DISCONTINUED | OUTPATIENT
Start: 2023-06-12 | End: 2023-06-12

## 2023-06-12 RX ORDER — NALOXONE HYDROCHLORIDE 4 MG/.1ML
4 SPRAY NASAL
Qty: 0 | Refills: 0 | DISCHARGE

## 2023-06-12 RX ORDER — ASPIRIN/CALCIUM CARB/MAGNESIUM 324 MG
1 TABLET ORAL
Refills: 0 | DISCHARGE

## 2023-06-12 RX ADMIN — FINASTERIDE 5 MILLIGRAM(S): 5 TABLET, FILM COATED ORAL at 06:49

## 2023-06-12 RX ADMIN — OXYCODONE HYDROCHLORIDE 10 MILLIGRAM(S): 5 TABLET ORAL at 10:51

## 2023-06-12 RX ADMIN — Medication 50 MILLIGRAM(S): at 06:48

## 2023-06-12 RX ADMIN — OXYCODONE HYDROCHLORIDE 10 MILLIGRAM(S): 5 TABLET ORAL at 06:23

## 2023-06-12 RX ADMIN — HYDROMORPHONE HYDROCHLORIDE 0.5 MILLIGRAM(S): 2 INJECTION INTRAMUSCULAR; INTRAVENOUS; SUBCUTANEOUS at 06:47

## 2023-06-12 RX ADMIN — Medication 975 MILLIGRAM(S): at 06:48

## 2023-06-12 RX ADMIN — Medication 975 MILLIGRAM(S): at 11:52

## 2023-06-12 RX ADMIN — ENOXAPARIN SODIUM 40 MILLIGRAM(S): 100 INJECTION SUBCUTANEOUS at 06:47

## 2023-06-12 RX ADMIN — OXYCODONE HYDROCHLORIDE 10 MILLIGRAM(S): 5 TABLET ORAL at 03:50

## 2023-06-12 RX ADMIN — HYDROMORPHONE HYDROCHLORIDE 0.5 MILLIGRAM(S): 2 INJECTION INTRAMUSCULAR; INTRAVENOUS; SUBCUTANEOUS at 14:12

## 2023-06-12 RX ADMIN — Medication 975 MILLIGRAM(S): at 05:25

## 2023-06-12 NOTE — PROGRESS NOTE ADULT - PROVIDER SPECIALTY LIST ADULT
Orthopedics
SICU
SICU
Trauma Surgery
Anesthesia
Cardiology
Orthopedics
Trauma Surgery
Trauma Surgery
Orthopedics
Trauma Surgery
SICU
Trauma Surgery
Cardiology

## 2023-06-12 NOTE — DISCHARGE NOTE NURSING/CASE MANAGEMENT/SOCIAL WORK - NSDCPEFALRISK_GEN_ALL_CORE
For information on Fall & Injury Prevention, visit: [email or web address] OR  [email or web address] OR  https://www.cdc.gov/steadi/patient.html

## 2023-06-12 NOTE — DISCHARGE NOTE NURSING/CASE MANAGEMENT/SOCIAL WORK - PATIENT PORTAL LINK FT
You can access the FollowMyHealth Patient Portal offered by [facility] by registering at the following website: [email or web address]. By joining [employer]’s FollowMyHealth portal, you will also be able to view your health information using other applications (apps) compatible with our system.

## 2023-06-12 NOTE — ED ADULT TRIAGE NOTE - CHIEF COMPLAINT QUOTE
BAYRON from [facility] rehab c/o fever. Patient was discharged from [facility] on 6/12 s/p R distal femur fracture. At triage, patient offers no other complaints at triage.

## 2023-06-12 NOTE — PROGRESS NOTE ADULT - SUBJECTIVE AND OBJECTIVE BOX
Subjective: Patient seen at bedside sleeping comfortably, per RN no current complaints, no overnight events, denies n/v/cp/sob.       MEDICATIONS  (STANDING):  acetaminophen     Tablet .. 975 milliGRAM(s) Oral every 6 hours  enoxaparin Injectable 40 milliGRAM(s) SubCutaneous every 24 hours  finasteride 5 milliGRAM(s) Oral every 24 hours  metoprolol tartrate 50 milliGRAM(s) Oral two times a day  polyethylene glycol 3350 17 Gram(s) Oral daily  senna 2 Tablet(s) Oral at bedtime    MEDICATIONS  (PRN):  HYDROmorphone  Injectable 0.5 milliGRAM(s) IV Push every 3 hours PRN Breakthrough pain  oxyCODONE    IR 10 milliGRAM(s) Oral every 4 hours PRN Severe Pain (7 - 10)  oxyCODONE    IR 5 milliGRAM(s) Oral every 4 hours PRN Moderate Pain (4 - 6)      Vital Signs Last 24 Hrs  T(C): 36.9 (12 Jun 2023 04:19), Max: 37.8 (11 Jun 2023 23:23)  T(F): 98.5 (12 Jun 2023 04:19), Max: 100 (11 Jun 2023 23:23)  HR: 89 (12 Jun 2023 04:19) (70 - 93)  BP: 143/81 (12 Jun 2023 04:19) (107/63 - 143/81)  BP(mean): 90 (11 Jun 2023 23:23) (82 - 90)  RR: 19 (12 Jun 2023 04:19) (18 - 20)  SpO2: 97% (12 Jun 2023 04:19) (93% - 100%)    Parameters below as of 12 Jun 2023 04:19  Patient On (Oxygen Delivery Method): room air        Physical Exam:    Constitutional: NAD  HEENT: PERRL, EOMI  Respiratory: Respirations non-labored, no accessory muscle use  Gastrointestinal: Soft, non-tender, non-distended  Neurological: A&O x 3  Extremities: RLE with dressing in place, right knee edematous, moving ankle/toes. Sensation intact      LABS:                        8.2    10.39 )-----------( 206      ( 10 Ang 2023 07:37 )             25.7       A: 68y Male with h/o HOCM admitted with R distal femur fx s/p IM nail    P:  -On verapamil as outpatient, cannot tolerate due to distributive shock. Failed disopyramide dosing with prolonged QTC  -C/w metoprolol. Dose / frequency increased per cardiology 2/2 tachy  -Outpatient follow up for MR and mevacatem  - Avoid hypotension, hypovolemia.   - Recommend to transfuse if HgB <8.   - maintain adequate fluid hydration for preload, currently off thiazide diuretics which is his home medication   - pain management at patient request

## 2023-06-12 NOTE — PROGRESS NOTE ADULT - REASON FOR ADMISSION
RLE distal femur fracture

## 2023-06-12 NOTE — PROGRESS NOTE ADULT - ATTENDING COMMENTS
Awake alert  Bilateral BS  Hemodynamic intact  Abdomen soft, active BS  Neurologic grossly intact   R knee swelling decreased   Pt to work with OTPT and will transfer to rehab
Awake alert  Bilateral BS  Hemodynamic intact  Abdomen soft, active BS  Neurologic grossly intact   R knee swelling decreased  Good distal pulses  DC to [facility]
Agree with above assessment.  The patient was seen and examined by myself with the surgical PA.  The patient is with pain in the right thigh. He states that the pain meds are not holding long enough.  He is for OR for fixation today with ortho.  Will adjust pain control accordingly. Otherwise trauma stable.

## 2023-06-13 DIAGNOSIS — I42.1 OBSTRUCTIVE HYPERTROPHIC CARDIOMYOPATHY: ICD-10-CM

## 2023-06-13 LAB
ALBUMIN SERPL ELPH-MCNC: 2.7 G/DL — LOW (ref 3.3–5.2)
ALP SERPL-CCNC: 98 U/L — SIGNIFICANT CHANGE UP (ref 40–120)
ALT FLD-CCNC: 26 U/L — SIGNIFICANT CHANGE UP
ANION GAP SERPL CALC-SCNC: 11 MMOL/L — SIGNIFICANT CHANGE UP (ref 5–17)
APPEARANCE UR: CLEAR — SIGNIFICANT CHANGE UP
APTT BLD: 30.8 SEC — SIGNIFICANT CHANGE UP (ref 27.5–35.5)
AST SERPL-CCNC: 35 U/L — SIGNIFICANT CHANGE UP
BASE EXCESS BLDV CALC-SCNC: -0.4 MMOL/L — SIGNIFICANT CHANGE UP (ref -2–3)
BASOPHILS # BLD AUTO: 0.04 K/UL — SIGNIFICANT CHANGE UP (ref 0–0.2)
BASOPHILS NFR BLD AUTO: 0.4 % — SIGNIFICANT CHANGE UP (ref 0–2)
BILIRUB SERPL-MCNC: 1 MG/DL — SIGNIFICANT CHANGE UP (ref 0.4–2)
BILIRUB UR-MCNC: NEGATIVE — SIGNIFICANT CHANGE UP
BUN SERPL-MCNC: 16.7 MG/DL — SIGNIFICANT CHANGE UP (ref 8–20)
CA-I SERPL-SCNC: 1.18 MMOL/L — SIGNIFICANT CHANGE UP (ref 1.15–1.33)
CALCIUM SERPL-MCNC: 8.6 MG/DL — SIGNIFICANT CHANGE UP (ref 8.4–10.5)
CHLORIDE BLDV-SCNC: 106 MMOL/L — SIGNIFICANT CHANGE UP (ref 96–108)
CHLORIDE SERPL-SCNC: 102 MMOL/L — SIGNIFICANT CHANGE UP (ref 96–108)
CO2 SERPL-SCNC: 24 MMOL/L — SIGNIFICANT CHANGE UP (ref 22–29)
COLOR SPEC: YELLOW — SIGNIFICANT CHANGE UP
CREAT SERPL-MCNC: 0.69 MG/DL — SIGNIFICANT CHANGE UP (ref 0.5–1.3)
DIFF PNL FLD: NEGATIVE — SIGNIFICANT CHANGE UP
EGFR: 100 ML/MIN/1.73M2 — SIGNIFICANT CHANGE UP
EOSINOPHIL # BLD AUTO: 0.14 K/UL — SIGNIFICANT CHANGE UP (ref 0–0.5)
EOSINOPHIL NFR BLD AUTO: 1.5 % — SIGNIFICANT CHANGE UP (ref 0–6)
GAS PNL BLDV: 136 MMOL/L — SIGNIFICANT CHANGE UP (ref 136–145)
GAS PNL BLDV: SIGNIFICANT CHANGE UP
GAS PNL BLDV: SIGNIFICANT CHANGE UP
GLUCOSE BLDV-MCNC: 102 MG/DL — HIGH (ref 70–99)
GLUCOSE SERPL-MCNC: 108 MG/DL — HIGH (ref 70–99)
GLUCOSE UR QL: NEGATIVE MG/DL — SIGNIFICANT CHANGE UP
HCO3 BLDV-SCNC: 25 MMOL/L — SIGNIFICANT CHANGE UP (ref 22–29)
HCT VFR BLD CALC: 26.6 % — LOW (ref 39–50)
HCT VFR BLDA CALC: 27 % — SIGNIFICANT CHANGE UP
HGB BLD CALC-MCNC: 8.9 G/DL — LOW (ref 12.6–17.4)
HGB BLD-MCNC: 8.5 G/DL — LOW (ref 13–17)
IMM GRANULOCYTES NFR BLD AUTO: 1.2 % — HIGH (ref 0–0.9)
INR BLD: 1.24 RATIO — HIGH (ref 0.88–1.16)
KETONES UR-MCNC: ABNORMAL
LACTATE BLDV-MCNC: 0.8 MMOL/L — SIGNIFICANT CHANGE UP (ref 0.5–2)
LEUKOCYTE ESTERASE UR-ACNC: NEGATIVE — SIGNIFICANT CHANGE UP
LYMPHOCYTES # BLD AUTO: 0.65 K/UL — LOW (ref 1–3.3)
LYMPHOCYTES # BLD AUTO: 7.1 % — LOW (ref 13–44)
MCHC RBC-ENTMCNC: 28.7 PG — SIGNIFICANT CHANGE UP (ref 27–34)
MCHC RBC-ENTMCNC: 32 GM/DL — SIGNIFICANT CHANGE UP (ref 32–36)
MCV RBC AUTO: 89.9 FL — SIGNIFICANT CHANGE UP (ref 80–100)
MONOCYTES # BLD AUTO: 0.82 K/UL — SIGNIFICANT CHANGE UP (ref 0–0.9)
MONOCYTES NFR BLD AUTO: 8.9 % — SIGNIFICANT CHANGE UP (ref 2–14)
NEUTROPHILS # BLD AUTO: 7.42 K/UL — HIGH (ref 1.8–7.4)
NEUTROPHILS NFR BLD AUTO: 80.9 % — HIGH (ref 43–77)
NITRITE UR-MCNC: NEGATIVE — SIGNIFICANT CHANGE UP
PCO2 BLDV: 43 MMHG — SIGNIFICANT CHANGE UP (ref 42–55)
PH BLDV: 7.37 — SIGNIFICANT CHANGE UP (ref 7.32–7.43)
PH UR: 5 — SIGNIFICANT CHANGE UP (ref 5–8)
PLATELET # BLD AUTO: 298 K/UL — SIGNIFICANT CHANGE UP (ref 150–400)
PO2 BLDV: 60 MMHG — HIGH (ref 25–45)
POTASSIUM BLDV-SCNC: 4.1 MMOL/L — SIGNIFICANT CHANGE UP (ref 3.5–5.1)
POTASSIUM SERPL-MCNC: 3.9 MMOL/L — SIGNIFICANT CHANGE UP (ref 3.5–5.3)
POTASSIUM SERPL-SCNC: 3.9 MMOL/L — SIGNIFICANT CHANGE UP (ref 3.5–5.3)
PROT SERPL-MCNC: 5.8 G/DL — LOW (ref 6.6–8.7)
PROT UR-MCNC: NEGATIVE — SIGNIFICANT CHANGE UP
PROTHROM AB SERPL-ACNC: 14.4 SEC — HIGH (ref 10.5–13.4)
RAPID RVP RESULT: SIGNIFICANT CHANGE UP
RBC # BLD: 2.96 M/UL — LOW (ref 4.2–5.8)
RBC # FLD: 14.6 % — HIGH (ref 10.3–14.5)
SAO2 % BLDV: 89.8 % — SIGNIFICANT CHANGE UP
SARS-COV-2 RNA SPEC QL NAA+PROBE: SIGNIFICANT CHANGE UP
SODIUM SERPL-SCNC: 136 MMOL/L — SIGNIFICANT CHANGE UP (ref 135–145)
SP GR SPEC: 1.02 — SIGNIFICANT CHANGE UP (ref 1.01–1.02)
UROBILINOGEN FLD QL: NEGATIVE MG/DL — SIGNIFICANT CHANGE UP
WBC # BLD: 9.18 K/UL — SIGNIFICANT CHANGE UP (ref 3.8–10.5)
WBC # FLD AUTO: 9.18 K/UL — SIGNIFICANT CHANGE UP (ref 3.8–10.5)

## 2023-06-13 PROCEDURE — 71275 CT ANGIOGRAPHY CHEST: CPT | Mod: 26,MA

## 2023-06-13 PROCEDURE — 71045 X-RAY EXAM CHEST 1 VIEW: CPT | Mod: 26

## 2023-06-13 PROCEDURE — 73552 X-RAY EXAM OF FEMUR 2/>: CPT | Mod: 26,RT

## 2023-06-13 PROCEDURE — 99284 EMERGENCY DEPT VISIT MOD MDM: CPT

## 2023-06-13 PROCEDURE — 93010 ELECTROCARDIOGRAM REPORT: CPT

## 2023-06-13 PROCEDURE — 99223 1ST HOSP IP/OBS HIGH 75: CPT

## 2023-06-13 PROCEDURE — 99283 EMERGENCY DEPT VISIT LOW MDM: CPT

## 2023-06-13 RX ORDER — LOPERAMIDE HCL 2 MG
2 TABLET ORAL ONCE
Refills: 0 | Status: COMPLETED | OUTPATIENT
Start: 2023-06-13 | End: 2023-06-13

## 2023-06-13 RX ORDER — OXYCODONE HYDROCHLORIDE 5 MG/1
10 TABLET ORAL ONCE
Refills: 0 | Status: DISCONTINUED | OUTPATIENT
Start: 2023-06-13 | End: 2023-06-13

## 2023-06-13 RX ORDER — KETOROLAC TROMETHAMINE 30 MG/ML
15 SYRINGE (ML) INJECTION ONCE
Refills: 0 | Status: DISCONTINUED | OUTPATIENT
Start: 2023-06-13 | End: 2023-06-13

## 2023-06-13 RX ORDER — VERAPAMIL HCL 240 MG
120 CAPSULE, EXTENDED RELEASE PELLETS 24 HR ORAL ONCE
Refills: 0 | Status: COMPLETED | OUTPATIENT
Start: 2023-06-13 | End: 2023-06-13

## 2023-06-13 RX ORDER — GABAPENTIN 400 MG/1
400 CAPSULE ORAL ONCE
Refills: 0 | Status: COMPLETED | OUTPATIENT
Start: 2023-06-13 | End: 2023-06-13

## 2023-06-13 RX ORDER — ATORVASTATIN CALCIUM 80 MG/1
80 TABLET, FILM COATED ORAL AT BEDTIME
Refills: 0 | Status: DISCONTINUED | OUTPATIENT
Start: 2023-06-13 | End: 2023-06-20

## 2023-06-13 RX ORDER — ENOXAPARIN SODIUM 100 MG/ML
40 INJECTION SUBCUTANEOUS ONCE
Refills: 0 | Status: COMPLETED | OUTPATIENT
Start: 2023-06-13 | End: 2023-06-13

## 2023-06-13 RX ORDER — ACETAMINOPHEN 500 MG
650 TABLET ORAL EVERY 6 HOURS
Refills: 0 | Status: DISCONTINUED | OUTPATIENT
Start: 2023-06-13 | End: 2023-06-20

## 2023-06-13 RX ORDER — SODIUM CHLORIDE 9 MG/ML
500 INJECTION INTRAMUSCULAR; INTRAVENOUS; SUBCUTANEOUS ONCE
Refills: 0 | Status: COMPLETED | OUTPATIENT
Start: 2023-06-13 | End: 2023-06-13

## 2023-06-13 RX ORDER — FINASTERIDE 5 MG/1
5 TABLET, FILM COATED ORAL DAILY
Refills: 0 | Status: DISCONTINUED | OUTPATIENT
Start: 2023-06-13 | End: 2023-06-20

## 2023-06-13 RX ORDER — POLYETHYLENE GLYCOL 3350 17 G/17G
17 POWDER, FOR SOLUTION ORAL ONCE
Refills: 0 | Status: COMPLETED | OUTPATIENT
Start: 2023-06-13 | End: 2023-06-13

## 2023-06-13 RX ORDER — VERAPAMIL HCL 240 MG
120 CAPSULE, EXTENDED RELEASE PELLETS 24 HR ORAL
Refills: 0 | Status: DISCONTINUED | OUTPATIENT
Start: 2023-06-13 | End: 2023-06-20

## 2023-06-13 RX ORDER — SENNA PLUS 8.6 MG/1
2 TABLET ORAL AT BEDTIME
Refills: 0 | Status: DISCONTINUED | OUTPATIENT
Start: 2023-06-13 | End: 2023-06-20

## 2023-06-13 RX ORDER — PANTOPRAZOLE SODIUM 20 MG/1
40 TABLET, DELAYED RELEASE ORAL
Refills: 0 | Status: DISCONTINUED | OUTPATIENT
Start: 2023-06-13 | End: 2023-06-20

## 2023-06-13 RX ORDER — ENOXAPARIN SODIUM 100 MG/ML
40 INJECTION SUBCUTANEOUS EVERY 12 HOURS
Refills: 0 | Status: DISCONTINUED | OUTPATIENT
Start: 2023-06-13 | End: 2023-06-20

## 2023-06-13 RX ORDER — METOPROLOL TARTRATE 50 MG
50 TABLET ORAL
Refills: 0 | Status: DISCONTINUED | OUTPATIENT
Start: 2023-06-13 | End: 2023-06-14

## 2023-06-13 RX ORDER — ACETAMINOPHEN 500 MG
975 TABLET ORAL ONCE
Refills: 0 | Status: DISCONTINUED | OUTPATIENT
Start: 2023-06-13 | End: 2023-06-13

## 2023-06-13 RX ORDER — OXYCODONE HYDROCHLORIDE 5 MG/1
5 TABLET ORAL ONCE
Refills: 0 | Status: DISCONTINUED | OUTPATIENT
Start: 2023-06-13 | End: 2023-06-13

## 2023-06-13 RX ADMIN — SENNA PLUS 2 TABLET(S): 8.6 TABLET ORAL at 22:19

## 2023-06-13 RX ADMIN — ENOXAPARIN SODIUM 40 MILLIGRAM(S): 100 INJECTION SUBCUTANEOUS at 22:20

## 2023-06-13 RX ADMIN — SODIUM CHLORIDE 500 MILLILITER(S): 9 INJECTION INTRAMUSCULAR; INTRAVENOUS; SUBCUTANEOUS at 10:21

## 2023-06-13 RX ADMIN — OXYCODONE HYDROCHLORIDE 10 MILLIGRAM(S): 5 TABLET ORAL at 09:30

## 2023-06-13 RX ADMIN — Medication 120 MILLIGRAM(S): at 18:51

## 2023-06-13 RX ADMIN — Medication 250 MILLIGRAM(S): at 08:17

## 2023-06-13 RX ADMIN — PANTOPRAZOLE SODIUM 40 MILLIGRAM(S): 20 TABLET, DELAYED RELEASE ORAL at 08:16

## 2023-06-13 RX ADMIN — SODIUM CHLORIDE 500 MILLILITER(S): 9 INJECTION INTRAMUSCULAR; INTRAVENOUS; SUBCUTANEOUS at 11:30

## 2023-06-13 RX ADMIN — Medication 2 MILLIGRAM(S): at 12:48

## 2023-06-13 RX ADMIN — Medication 50 MILLIGRAM(S): at 08:16

## 2023-06-13 RX ADMIN — Medication 100 MILLIGRAM(S): at 18:51

## 2023-06-13 RX ADMIN — Medication 250 MILLIGRAM(S): at 09:30

## 2023-06-13 RX ADMIN — OXYCODONE HYDROCHLORIDE 5 MILLIGRAM(S): 5 TABLET ORAL at 18:51

## 2023-06-13 RX ADMIN — OXYCODONE HYDROCHLORIDE 10 MILLIGRAM(S): 5 TABLET ORAL at 08:32

## 2023-06-13 RX ADMIN — ATORVASTATIN CALCIUM 80 MILLIGRAM(S): 80 TABLET, FILM COATED ORAL at 22:20

## 2023-06-13 RX ADMIN — POLYETHYLENE GLYCOL 3350 17 GRAM(S): 17 POWDER, FOR SOLUTION ORAL at 08:16

## 2023-06-13 RX ADMIN — Medication 650 MILLIGRAM(S): at 18:51

## 2023-06-13 RX ADMIN — GABAPENTIN 400 MILLIGRAM(S): 400 CAPSULE ORAL at 08:16

## 2023-06-13 RX ADMIN — Medication 100 MILLIGRAM(S): at 06:57

## 2023-06-13 RX ADMIN — SENNA PLUS 2 TABLET(S): 8.6 TABLET ORAL at 07:32

## 2023-06-13 RX ADMIN — ENOXAPARIN SODIUM 40 MILLIGRAM(S): 100 INJECTION SUBCUTANEOUS at 08:16

## 2023-06-13 RX ADMIN — FINASTERIDE 5 MILLIGRAM(S): 5 TABLET, FILM COATED ORAL at 08:17

## 2023-06-13 RX ADMIN — Medication 120 MILLIGRAM(S): at 07:33

## 2023-06-13 NOTE — ED PROVIDER NOTE - CLINICAL SUMMARY MEDICAL DECISION MAKING FREE TEXT BOX
Postsurgical fever without readily identified source, no focal symptoms. Cultures of blood and urine sent for follow up. Seen by orthopedics, no suspected complications of surgery. Will observe of abx. Pt would to be evaluated for new placement, feels he was not being cared for at SNF.

## 2023-06-13 NOTE — CONSULT NOTE ADULT - PROBLEM SELECTOR RECOMMENDATION 9
- Pt w/ known HOCM   - Echo with preserved LV EF with LVOT gradient of 90 mmHg  - discharged on verapamil and metoprolol   - patient is dehydrated, has active infection, and did not take his verapamil/metoprolol   - He clear reasons for tachycardia  - resume verapamil 120mg BID and metoprolol 50mg BID   - treat underlying infection, resume PO diet/fluids   - no further inpatient cardiology workup or recommendations, will sign off

## 2023-06-13 NOTE — CONSULT NOTE ADULT - ASSESSMENT
68M PMH CAD (CCTA LAD 50%, OM 40%), HTN, HLD, HOCM, ?pAF initially presenting s/p fall from 10ft on ladder, right femur fx s/p IM nail. Cardiology following for HOCM management pre and post op. Pt subsequently discharged to rehab and came back to [facility] due to fever and tachycardia. Cardiology reconsulted for rate control.

## 2023-06-13 NOTE — ED PROVIDER NOTE - OBJECTIVE STATEMENT
68-year-old male with history of hypertension, hyperlipidemia, recent right femur surgery presents to the ED with fever.  Patient had a fracture of his distal femur that was repaired by Ortho 68-year-old male with history of hypertension, hyperlipidemia, recent right femur surgery presents to the ED with fever.  Patient had a fracture of his distal femur that was repaired by Ortho, discharged to rehab yesterday. Denies any complaint that would suggest source of fever.

## 2023-06-13 NOTE — CONSULT NOTE ADULT - SUBJECTIVE AND OBJECTIVE BOX
[facility]                                              CARDIOLOGY AT [address], [place] [address]                                             Telephone: [phone]. Fax:[phone]                                                       CARDIOLOGY CONSULTATION NOTE                                                                                             History obtained by: Patient and medical record  Community Cardiologist: Lui    obtained: Yes [  ] No [ x ]  Reason for Consultation: HOCM   Available out pt records reviewed: Yes [ x ] No [  ]    Chief complaint:    Patient is a 68y old  Male who presents with a chief complaint of s/p ORIF right femur/ IM nail (2023 08:16)      HPI:  68M PMH CAD (CCTA LAD 50%, OM 40%), HTN, HLD, HOCM, ?pAF initially presenting s/p fall from 10ft on ladder, right femur fx s/p IM nail. Cardiology following for HOCM management pre and post op. Pt subsequently discharged to rehab and came back to [facility] due to fever and tachycardia. Cardiology reconsulted for rate control.     CARDIAC TESTING   ECHO:  < from: TTE Echo Complete w/ Contrast w/ Doppler (23 @ 20:20) >  Summary:   1. Technically difficult study.   2. Left ventricular ejection fraction, by visual estimation, is 60 to   65%.   3. Normal globalleft ventricular systolic function.   4. There is severe concentric left ventricular hypertrophy.   5. Hypertrophic CMP with LVOT max gradient 96 mmHg.   6. Severely enlarged left atrium.   7. Degenerative mitral valve.   8. Mild to moderate mitral annular calcification.   9. Moderate anterior leaflet systolic anterior motion of the mitral   valve.  10. Moderate to severe mitral valve regurgitation.  11. Prolapse of posterior leaflet of the mitral valve.  12. Mild tricuspid regurgitation.  13. Mild aortic regurgitation.  14. Sclerotic aortic valve with decreased opening.  15. Estimated pulmonary artery systolic pressure is 39.2 mmHg assuming a   right atrial pressure of 3 mmHg, which is consistent with borderline   pulmonary hypertension.  16. There is a large liver cyst 8.7cm x 9.12 cm, consider dedicated liver   imaging.    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    Hypertension    Hyperlipidemia    History of BPH    Perforated bowel    Incisional hernia    PAST SURGICAL HISTORY  History of surgery on arm    [place] teeth extracted    S/P small bowel resection    S/P colostomy    SOCIAL HISTORY:  Denies smoking/alcohol/drugs  CIGARETTES:     ALCOHOL:  DRUGS:    FAMILY HISTORY:  No pertinent family history in first degree relatives    Family History of Cardiovascular Disease:  Yes [  ] No [ x ]  Coronary Artery Disease in first degree relative: Yes [  ] No [ x ]  Sudden Cardiac Death in First degree relative: Yes [  ] No [ x ]    HOME MEDICATIONS:  enoxaparin: 40 milligram(s) subcutaneous once a day (2023 20:26)  finasteride 5 mg oral tablet: 1 tab(s) orally once a day (2020 13:12)  gabapentin 400 mg oral capsule: 1 cap(s) orally once a day (at bedtime) (2023 13:01)  metoprolol tartrate 50 mg oral tablet: 1 tab(s) orally 2 times a day (2023 20:26)  naloxone 4 mg/0.1 mL nasal spray: 4 spray intranasally once (2023 13:01)  naproxen 250 mg oral tablet: 1 tab(s) orally 2 times a day (2023 13:01)  omeprazole 20 mg oral delayed release capsule: 1 cap(s) orally once a day (2023 07:22)  oxyCODONE 10 mg oral tablet: 1 tab(s) orally every 4 hours As needed Severe Pain (7 - 10) (2023 11:22)  oxyCODONE 5 mg oral tablet: 1 tab(s) orally every 4 hours As needed Moderate Pain (4 - 6) (2023 11:22)  polyethylene glycol 3350 oral powder for reconstitution: 17 gram(s) orally once a day (10 Ang 2023 20:37)  rosuvastatin 20 mg oral tablet: 1 tab(s) orally once a day (2023 07:21)  senna leaf extract oral tablet: 2 tab(s) orally once a day (at bedtime) (10 Ang 2023 20:37)  Tylenol: 650 milligram(s) orally every 6 hours (2020 19:42)  verapamil 120 mg oral tablet: 1 tab(s) orally 2 times a day (2023 07:21)    CURRENT CARDIAC MEDICATIONS:  metoprolol tartrate 50 milliGRAM(s) Oral two times a day    CURRENT OTHER MEDICATIONS:  acetaminophen     Tablet .. 650 milliGRAM(s) Oral every 6 hours PRN Mild Pain (1 - 3)  oxyCODONE    IR 5 milliGRAM(s) Oral Once, Stop order after: 1 Doses PRN Moderate Pain (4 - 6)  pantoprazole    Tablet 40 milliGRAM(s) Oral before breakfast  senna 2 Tablet(s) Oral at bedtime  atorvastatin 80 milliGRAM(s) Oral at bedtime  finasteride 5 milliGRAM(s) Oral daily    ALLERGIES:   polymyxin B sulfate (Unknown)    REVIEW OF SYMPTOMS:   CONSTITUTIONAL: No fever, no chills, no weight loss, no weight gain, no fatigue   ENMT:  No vertigo; No sinus or throat pain  NECK: No pain or stiffness  CARDIOVASCULAR: No chest pain, no dyspnea, no syncope/presyncope, no palpitations, no dizziness, no Orthopnea, no Paroxsymal nocturnal dyspnea  RESPIRATORY: no Shortness of breath, no cough, no wheezing  : No dysuria, no hematuria   GI: No dark color stool, no nausea, no diarrhea, no constipation, no abdominal pain   NEURO: No headache, no slurred speech   MUSCULOSKELETAL: No joint pain or swelling; No muscle, back, or extremity pain  PSYCH: No agitation, no anxiety.    ALL OTHER REVIEW OF SYSTEMS ARE NEGATIVE.    VITAL SIGNS:  T(C): 37.7 (23 @ 05:18), Max: 38 (23 @ 23:51)  T(F): 99.8 (23 @ 05:18), Max: 100.4 (23 @ 23:51)  HR: 102 (23 @ 05:18) (94 - 105)  BP: 126/78 (23 @ 05:18) (121/75 - 135/91)  RR: 16 (23 @ 05:18) (16 - 20)  SpO2: 97% (23 @ 05:18) (95% - 97%)     PHYSICAL EXAM:  Constitutional: Comfortable . No acute distress.   HEENT: Atraumatic and normocephalic , neck is supple . no JVD. No carotid bruit.  CNS: A&Ox3. No focal deficits.   Respiratory: CTAB, unlabored   Cardiovascular: RRR normal s1 s2. No murmur. No rubs or gallop.  Gastrointestinal: Soft, non-tender. +Bowel sounds.   Extremities: 2+ Peripheral Pulses, No clubbing, cyanosis, or edema  Psychiatric: Calm . no agitation.   Skin: Warm and dry, no ulcers on extremities     LABS:  ( 2023 05:45 )  Troponin T  0.16<H>,  CPK  X    , CKMB  X    , BNP X        , ( 2023 21:20 )  Troponin T  0.16<H>,  CPK  X    , CKMB  X    , BNP X        , ( 2023 16:42 )  Troponin T  0.18<H>,  CPK  X    , CKMB  X    , BNP X                   8.5    9.18  )-----------( 298      ( 2023 01:16 )             26.6     06-13    136  |  102  |  16.7  ----------------------------<  108<H>  3.9   |  24.0  |  0.69    Ca    8.6      2023 01:16    TPro  5.8<L>  /  Alb  2.7<L>  /  TBili  1.0  /  DBili  x   /  AST  35  /  ALT  26  /  AlkPhos  98  06-13    PT/INR - ( 2023 01:16 )   PT: 14.4 sec;   INR: 1.24 ratio      PTT - ( 2023 01:16 )  PTT:30.8 sec  Urinalysis Basic - ( 2023 04:34 )    Color: Yellow / Appearance: Clear / S.020 / pH: x  Gluc: x / Ketone: Trace  / Bili: Negative / Urobili: Negative mg/dL   Blood: x / Protein: Negative / Nitrite: Negative   Leuk Esterase: Negative / RBC: x / WBC x   Sq Epi: x / Non Sq Epi: x / Bacteria: x    INTERPRETATION OF TELEMETRY:     ECG:   < from: 12 Lead ECG (23 @ 00:45) >  Ventricular Rate 103 BPM    Atrial Rate 103 BPM    P-R Interval 142 ms    QRS Duration 126 ms    Q-T Interval 390 ms    QTC Calculation(Bazett) 510 ms    P Axis 75 degrees    R Axis -56 degrees    T Axis 65 degrees    Diagnosis Line Sinus tachycardia  Possible Left atrial enlargement  Left axis deviation  Non-specific intra-ventricular conduction block  Abnormal ECG    Confirmed by ABELARDO CROWE ([id number]) on 2023 7:21:38 AM    < end of copied text >    Prior ECG: Yes [  ] No [ x ]

## 2023-06-13 NOTE — ED ADULT NURSE NOTE - CHIEF COMPLAINT QUOTE
BAYRON from [facility] rehab c/o fever. Patient was discharged from [facility] on 6/12 s/p R distal femur fracture. At triage, patient offers no other complaints at triage.

## 2023-06-13 NOTE — CONSULT NOTE ADULT - NS ATTEND AMEND GEN_ALL_CORE FT
Patient seen and examined by me.    T(C): 37.7 (06-13-23 @ 05:18), Max: 38 (06-12-23 @ 23:51)  HR: 102 (06-13-23 @ 05:18) (94 - 105)  BP: 126/78 (06-13-23 @ 05:18) (121/75 - 135/91)  RR: 16 (06-13-23 @ 05:18) (16 - 20)  SpO2: 97% (06-13-23 @ 05:18) (95% - 97%)  Patient alert and awake.  Chest- Bilateral Clear BS  Cardiac- S1 and S2, Systolic murmur+  Abdomen- Soft    Assessment:  1. HOCM with tachycardia  2. Fever    Recommendations:      I have discussed my recommendation with the PA which are outlined above.  Will follow.

## 2023-06-13 NOTE — PROGRESS NOTE ADULT - SUBJECTIVE AND OBJECTIVE BOX
CATHY LEVI    [id number]    History:  The patient is status post distal femur ORIF 6/6 IM nail with lateral plate and screws.  Patient is comfortable at this time.The patient's pain is controlled using the prescribed pain medications. The patient is participating in physical therapy, WBAT RLE.  Denies nausea, vomiting, chest pain, shortness of breath, abdominal pain or fever. No new complaints. No acute motor or sensory changes are reported.    Vital Signs Last 24 Hrs  T(C): 37.7 (13 Jun 2023 05:18), Max: 38 (12 Jun 2023 23:51)  T(F): 99.8 (13 Jun 2023 05:18), Max: 100.4 (12 Jun 2023 23:51)  HR: 102 (13 Jun 2023 05:18) (94 - 105)  BP: 126/78 (13 Jun 2023 05:18) (121/75 - 135/91)  BP(mean): --  RR: 16 (13 Jun 2023 05:18) (16 - 20)  SpO2: 97% (13 Jun 2023 05:18) (95% - 97%)    Parameters below as of 12 Jun 2023 23:51  Patient On (Oxygen Delivery Method): room air      I&O's Summary                            8.5    9.18  )-----------( 298      ( 13 Jun 2023 01:16 )             26.6     06-13    136  |  102  |  16.7  ----------------------------<  108<H>  3.9   |  24.0  |  0.69    Ca    8.6      13 Jun 2023 01:16    TPro  5.8<L>  /  Alb  2.7<L>  /  TBili  1.0  /  DBili  x   /  AST  35  /  ALT  26  /  AlkPhos  98  06-13      MEDICATIONS  (STANDING):  atorvastatin 80 milliGRAM(s) Oral at bedtime  enoxaparin Injectable 40 milliGRAM(s) SubCutaneous Once  finasteride 5 milliGRAM(s) Oral daily  gabapentin 400 milliGRAM(s) Oral Once  metoprolol tartrate 50 milliGRAM(s) Oral two times a day  naproxen 250 milliGRAM(s) Oral Once  pantoprazole    Tablet 40 milliGRAM(s) Oral before breakfast  polyethylene glycol 3350 17 Gram(s) Oral Once  senna 2 Tablet(s) Oral at bedtime    MEDICATIONS  (PRN):  acetaminophen     Tablet .. 650 milliGRAM(s) Oral every 6 hours PRN Mild Pain (1 - 3)  oxyCODONE    IR 5 milliGRAM(s) Oral Once PRN Moderate Pain (4 - 6)  oxyCODONE    IR 10 milliGRAM(s) Oral Once PRN Severe Pain (7 - 10)      Physical exam:  The dressing is clean, dry and intact. No wound erythema, discharge, drainage is noted. Sensation to light touch is grossly intact without focal deficit and is symmetric bilaterally. No calf tenderness. Sensation to light touch is grossly intact distally. Motor function distally is 5/5. No foot drop. 2+ dorsalis pedis pulse. Capillary refill is less than 2 seconds. No cyanosis.    Primary Orthopedic Assessment:  •   Secondary  Orthopedic Assessment(s):   •   Secondary  Medical Assessment(s):   •   Plan:   • DVT prophylaxis as prescribed, including use of compression devices and ankle pumps  • Continue physical therapy  •WBAT  • Pain control as clinically indicated  • Incentive spirometry encouraged  • Discharge planning –Patient is medically optimized to return to rehab today,
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
## 2023-06-13 NOTE — ED ADULT NURSE NOTE - OBJECTIVE STATEMENT
Pt presents to ED c/o fever. Pt presents to ED from [facility] rehab c/o fever, pt recently dc'd from [facility]  s/p R distal femur fracture. Pt denies any pain or discomfort at this time. MD at bedside for further evaluation.

## 2023-06-13 NOTE — ED CDU PROVIDER INITIAL DAY NOTE - OBJECTIVE STATEMENT
68-year-old male with history of hypertension, hyperlipidemia, recent right femur surgery presents to the ED with fever.  Patient had a fracture of his distal femur that was repaired by Ortho, discharged to rehab yesterday. Denies any complaint that would suggest source of fever.

## 2023-06-14 VITALS
RESPIRATION RATE: 18 BRPM | OXYGEN SATURATION: 99 % | SYSTOLIC BLOOD PRESSURE: 111 MMHG | HEART RATE: 88 BPM | DIASTOLIC BLOOD PRESSURE: 62 MMHG | TEMPERATURE: 99 F

## 2023-06-14 LAB
CULTURE RESULTS: NO GROWTH — SIGNIFICANT CHANGE UP
EPEC DNA STL QL NAA+PROBE: DETECTED
GI PCR PANEL: DETECTED
SPECIMEN SOURCE: SIGNIFICANT CHANGE UP

## 2023-06-14 PROCEDURE — 82435 ASSAY OF BLOOD CHLORIDE: CPT

## 2023-06-14 PROCEDURE — 96360 HYDRATION IV INFUSION INIT: CPT

## 2023-06-14 PROCEDURE — 84132 ASSAY OF SERUM POTASSIUM: CPT

## 2023-06-14 PROCEDURE — 85610 PROTHROMBIN TIME: CPT

## 2023-06-14 PROCEDURE — 87046 STOOL CULTR AEROBIC BACT EA: CPT

## 2023-06-14 PROCEDURE — 82803 BLOOD GASES ANY COMBINATION: CPT

## 2023-06-14 PROCEDURE — 71045 X-RAY EXAM CHEST 1 VIEW: CPT

## 2023-06-14 PROCEDURE — 0225U NFCT DS DNA&RNA 21 SARSCOV2: CPT

## 2023-06-14 PROCEDURE — 82947 ASSAY GLUCOSE BLOOD QUANT: CPT

## 2023-06-14 PROCEDURE — 84295 ASSAY OF SERUM SODIUM: CPT

## 2023-06-14 PROCEDURE — 73552 X-RAY EXAM OF FEMUR 2/>: CPT

## 2023-06-14 PROCEDURE — 85018 HEMOGLOBIN: CPT

## 2023-06-14 PROCEDURE — 99283 EMERGENCY DEPT VISIT LOW MDM: CPT

## 2023-06-14 PROCEDURE — 87045 FECES CULTURE AEROBIC BACT: CPT

## 2023-06-14 PROCEDURE — G0378: CPT

## 2023-06-14 PROCEDURE — 87086 URINE CULTURE/COLONY COUNT: CPT

## 2023-06-14 PROCEDURE — 87507 IADNA-DNA/RNA PROBE TQ 12-25: CPT

## 2023-06-14 PROCEDURE — 99238 HOSP IP/OBS DSCHRG MGMT 30/<: CPT

## 2023-06-14 PROCEDURE — 93005 ELECTROCARDIOGRAM TRACING: CPT

## 2023-06-14 PROCEDURE — 87040 BLOOD CULTURE FOR BACTERIA: CPT

## 2023-06-14 PROCEDURE — 71275 CT ANGIOGRAPHY CHEST: CPT | Mod: MA

## 2023-06-14 PROCEDURE — 36415 COLL VENOUS BLD VENIPUNCTURE: CPT

## 2023-06-14 PROCEDURE — 85730 THROMBOPLASTIN TIME PARTIAL: CPT

## 2023-06-14 PROCEDURE — 83605 ASSAY OF LACTIC ACID: CPT

## 2023-06-14 PROCEDURE — 80053 COMPREHEN METABOLIC PANEL: CPT

## 2023-06-14 PROCEDURE — 99285 EMERGENCY DEPT VISIT HI MDM: CPT

## 2023-06-14 PROCEDURE — 85025 COMPLETE CBC W/AUTO DIFF WBC: CPT

## 2023-06-14 PROCEDURE — 81003 URINALYSIS AUTO W/O SCOPE: CPT

## 2023-06-14 PROCEDURE — 82330 ASSAY OF CALCIUM: CPT

## 2023-06-14 PROCEDURE — 85014 HEMATOCRIT: CPT

## 2023-06-14 RX ORDER — OXYCODONE HYDROCHLORIDE 5 MG/1
10 TABLET ORAL ONCE
Refills: 0 | Status: DISCONTINUED | OUTPATIENT
Start: 2023-06-14 | End: 2023-06-14

## 2023-06-14 RX ADMIN — Medication 100 MILLIGRAM(S): at 05:14

## 2023-06-14 RX ADMIN — OXYCODONE HYDROCHLORIDE 10 MILLIGRAM(S): 5 TABLET ORAL at 12:26

## 2023-06-14 RX ADMIN — ENOXAPARIN SODIUM 40 MILLIGRAM(S): 100 INJECTION SUBCUTANEOUS at 08:14

## 2023-06-14 RX ADMIN — PANTOPRAZOLE SODIUM 40 MILLIGRAM(S): 20 TABLET, DELAYED RELEASE ORAL at 05:10

## 2023-06-14 RX ADMIN — FINASTERIDE 5 MILLIGRAM(S): 5 TABLET, FILM COATED ORAL at 08:14

## 2023-06-14 RX ADMIN — Medication 120 MILLIGRAM(S): at 05:11

## 2023-06-14 RX ADMIN — Medication 650 MILLIGRAM(S): at 08:13

## 2023-06-14 RX ADMIN — Medication 650 MILLIGRAM(S): at 09:00

## 2023-06-14 RX ADMIN — OXYCODONE HYDROCHLORIDE 10 MILLIGRAM(S): 5 TABLET ORAL at 11:40

## 2023-06-14 NOTE — DISCHARGE NOTE NURSING/CASE MANAGEMENT/SOCIAL WORK - PATIENT PORTAL LINK FT
You can access the FollowMyHealth Patient Portal offered by [facility] by registering at the following website: [email or web address]. By joining [facility]’s FollowMyHealth portal, you will also be able to view your health information using other applications (apps) compatible with our system.

## 2023-06-14 NOTE — PROGRESS NOTE ADULT - PROBLEM SELECTOR PLAN 1
-   - d/c metoprolol- pt with consistent diarrhea in past (refused dose this am)  - cont verapamil   - no further cardiac work up at this time  - given 500cc IVF  - encourage water intake, IVF if fever returns     Thank you for allowing me to participate in care of your patient.   Please call as needed.  Follow up with Dr. Poe as out patient

## 2023-06-14 NOTE — DISCHARGE NOTE NURSING/CASE MANAGEMENT/SOCIAL WORK - NSDCCRNAME_GEN_ALL_CORE_FT
PATIENT WILL BE TRANSFERRED TO [facility] LOCATED ON [address] [place] [address] FOR SUBACUTE REHAB TODAY.

## 2023-06-14 NOTE — ED ADULT NURSE REASSESSMENT NOTE - NSFALLHARMRISKINTERV_ED_ALL_ED
Assistance OOB with selected safe patient handling equipment if applicable/Assistance with ambulation/Communicate risk of Fall with Harm to all staff, patient, and family/Monitor gait and stability/Provide patient with walking aids/Provide visual cue: red socks, yellow wristband, yellow gown, etc/Reinforce activity limits and safety measures with patient and family/Bed in lowest position, wheels locked, appropriate side rails in place/Call bell, personal items and telephone in reach/Instruct patient to call for assistance before getting out of bed/chair/stretcher/Non-slip footwear applied when patient is off stretcher/[place] to call system/Physically safe environment - no spills, clutter or unnecessary equipment/Purposeful Proactive Rounding/Room/bathroom lighting operational, light cord in reach
Assistance OOB with selected safe patient handling equipment if applicable/Assistance with ambulation/Communicate risk of Fall with Harm to all staff, patient, and family/Monitor gait and stability/Provide patient with walking aids/Provide visual cue: red socks, yellow wristband, yellow gown, etc/Reinforce activity limits and safety measures with patient and family/Bed in lowest position, wheels locked, appropriate side rails in place/Call bell, personal items and telephone in reach/Instruct patient to call for assistance before getting out of bed/chair/stretcher/Non-slip footwear applied when patient is off stretcher/[place] to call system/Physically safe environment - no spills, clutter or unnecessary equipment/Purposeful Proactive Rounding/Room/bathroom lighting operational, light cord in reach

## 2023-06-14 NOTE — ED ADULT NURSE REASSESSMENT NOTE - COMFORT CARE
plan of care explained/po fluids offered/repositioned/wait time explained/warm blanket provided
meal provided/plan of care explained/po fluids offered/repositioned/wait time explained

## 2023-06-14 NOTE — ED CDU PROVIDER SUBSEQUENT DAY NOTE - NSICDXFAMILYHX_GEN_ALL_CORE_FT
Follow Up Office Visit      Patient Name: Luh Mckeon  : 1931   MRN: [id number]   Care Team: Patient Care Team:  Schuyler Garcia MD as PCP - General  Schuyler Garcia MD as PCP - Family Medicine    Chief Complaint:    Chief Complaint   Patient presents with   • Nausea     since yestersday   • Dizziness     since yesterday,,, blood pressure was high yestreday and low today/.   • Anorexia     loss of appitite       History of Present Illness: Luh Mckeon is a 89 y.o. female with pertinent medical history significant for hyperlipidemia, hypertension, mitral and aortic valve disorders, paroxysmal A. fib, diverticular disease, urinary incontinence currently on Myrbetriq, anxiety, cognitive impairment and osteoporosis.    She presents today with 2-day history of nausea, dizziness and loss of appetite.  She is accompanied by her daughter who states that 2 nights ago, patient had her house extremely warm and noted that the caregiver had reported that the patient was very hot when she went to bed.  Patient reports that she awoke several times throughout the night feeling flushed and hot, the house did not cool down.  Upon waking the next morning, she had nausea dizziness and no appetite.  She also had elevated BP readings of 130s over 90s throughout the day.  She did not eat or drink anything until 6 PM that evening.  Felt somewhat better after eating some cottage cheese and peaches.  However, awoke the next morning (today) with ongoing symptoms of dizziness and nausea as well as low appetite.    Patient admits that she continue to take her medication as prescribed throughout the event described above.    Per daughter, patient has not eaten or drank anything today.  Patient admits she did have BM today and is urinating without difficulty.  Denies any associated fever, chills, headache, syncope, chest pain, cough, vomiting or diarrhea.    Subjective        I have reviewed and the following  portions of the patient's history were updated as appropriate: past family history, past medical history, past social history, past surgical history and problem list.    Medications:     Current Outpatient Medications:   •  acetaminophen (TYLENOL) 325 MG tablet, Take 650 mg by mouth Every 4 (Four) Hours As Needed for Mild Pain ., Disp: , Rfl:   •  apixaban (ELIQUIS) 2.5 MG tablet tablet, Take 1 tablet by mouth Every 12 (Twelve) Hours. Indications: Atrial Fibrillation, Disp: 60 tablet, Rfl: 5  •  bisacodyl (DULCOLAX) 10 MG suppository, Insert 10 mg into the rectum Daily As Needed for Constipation., Disp: , Rfl:   •  cholecalciferol (VITAMIN D3) 10 MCG (400 UNIT) tablet, Take 1 tablet by mouth Every Morning., Disp: , Rfl:   •  Cranberry 450 MG tablet, Take 1 tablet by mouth Daily., Disp: , Rfl:   •  dilTIAZem CD (CARDIZEM CD) 120 MG 24 hr capsule, Take 1 capsule by mouth Daily., Disp: 90 capsule, Rfl: 1  •  docusate sodium (COLACE) 100 MG capsule, Take 100 mg by mouth 2 (Two) Times a Day., Disp: , Rfl:   •  hydrocortisone (ANUSOL-HC) 25 MG suppository, Insert 1 suppository into the rectum 2 (Two) Times a Day As Needed for Hemorrhoids., Disp: , Rfl:   •  melatonin 3 MG tablet, Take 3 mg by mouth Every Night., Disp: , Rfl:   •  Mirabegron ER (Myrbetriq) 50 MG tablet sustained-release 24 hour 24 hr tablet, Take 50 mg by mouth Daily., Disp: 90 tablet, Rfl: 1  •  omeprazole (priLOSEC) 40 MG capsule, Take 1 capsule by mouth Daily., Disp: 90 capsule, Rfl: 1  •  polyethylene glycol (MiraLax) 17 GM/SCOOP powder, Take 17 g by mouth. Once a day on Mon, Wed, Fri and PRN, Disp: , Rfl:   •  rosuvastatin (CRESTOR) 5 MG tablet, Take 1 tablet by mouth Every Night., Disp: 90 tablet, Rfl: 1  •  ondansetron ODT (Zofran ODT) 4 MG disintegrating tablet, Place 1 tablet on the tongue Every 8 (Eight) Hours As Needed for Nausea or Vomiting., Disp: 15 tablet, Rfl: 0    Allergies:   Allergies   Allergen Reactions   • Nasal Spray Cough  "      Objective     Physical Exam:  Vital Signs:   Vitals:    12/02/21 1317   BP: 126/80   BP Location: Left arm   Patient Position: Sitting   Cuff Size: Adult   Pulse: 89   Temp: 97.7 °F (36.5 °C)   TempSrc: Temporal   Weight: 64.1 kg (141 lb 4.8 oz)   Height: 157.5 cm (62.01\")   PainSc: 0-No pain     Body mass index is 25.84 kg/m².     Physical Exam  Vitals and nursing note reviewed.   Constitutional:       General: She is not in acute distress.  HENT:      Head: Normocephalic and atraumatic.   Cardiovascular:      Heart sounds: Murmur heard.       Pulmonary:      Effort: Pulmonary effort is normal. No respiratory distress.   Abdominal:      General: Bowel sounds are normal. There is no distension.      Tenderness: There is no abdominal tenderness. There is no guarding.   Musculoskeletal:      Comments: Patient ambulating with walker.  Able to rise from a seated position using walker.   Skin:     General: Skin is warm and dry.      Comments: Poor skin turgor noted   Neurological:      Mental Status: She is alert.   Psychiatric:         Mood and Affect: Mood normal.         Thought Content: Thought content normal.         Assessment / Plan      Assessment/Plan:   Problems Addressed This Visit    ICD-10-CM ICD-9-CM   1. Nausea  R11.0 787.02   2. Other fatigue  R53.83 780.79   3. Benign essential hypertension  I10 401.1       Nausea  Fatigue  Dizziness  -All likely related to volume depletion and lack of proper nutrition x2 days, following night of being overheated  -Zofran 4 mg ODT as needed every 8 hours  -Advised patient to force fluids and nutrition at this time, rest  -Ordered CMP if symptoms not improved by tomorrow with above recommendations    Hypertension  -Stable today with reading of 126/80  -Continue therapy of diltiazem 120 mg daily      Plan of care reviewed with patient at the conclusion of today's visit. Education was provided regarding diagnosis and management.  Patient verbalizes understanding of " and agreement with management plan.    Patient Instructions   Zofran for nausea  Force fluids and nutrition at this time  Continue medications for blood pressure as prescribed      Dehydration, Adult  Dehydration is a condition in which there is not enough water or other fluids in the body. This happens when a person loses more fluids than he or she takes in. Important organs, such as the kidneys, brain, and heart, cannot function without a proper amount of fluids. Any loss of fluids from the body can lead to dehydration.  Dehydration can be mild, moderate, or severe. It should be treated right away to prevent it from becoming severe.  What are the causes?  Dehydration may be caused by:  · Conditions that cause loss of water or other fluids, such as diarrhea, vomiting, or sweating or urinating a lot.  · Not drinking enough fluids, especially when you are ill or doing activities that require a lot of energy.  · Other illnesses and conditions, such as fever or infection.  · Certain medicines, such as medicines that remove excess fluid from the body (diuretics).  · Lack of safe drinking water.  · Not being able to get enough water and food.  What increases the risk?  The following factors may make you more likely to develop this condition:  · Having a long-term (chronic) illness that has not been treated properly, such as diabetes, heart disease, or kidney disease.  · Being 65 years of age or older.  · Having a disability.  · Living in a place that is high in altitude, where thinner, drier air causes more fluid loss.  · Doing exercises that put stress on your body for a long time (endurance sports).  What are the signs or symptoms?  Symptoms of dehydration depend on how severe it is.  Mild or moderate dehydration  · Thirst.  · Dry lips or dry mouth.  · Dizziness or light-headedness, especially when standing up from a seated position.  · Muscle cramps.  · Dark urine. Urine may be the color of tea.  · Less urine or tears  FAMILY HISTORY:  No pertinent family history in first degree relatives     produced than usual.  · Headache.  Severe dehydration  · Changes in skin. Your skin may be cold and clammy, blotchy, or pale. Your skin also may not return to normal after being lightly pinched and released.  · Little or no tears, urine, or sweat.  · Changes in vital signs, such as rapid breathing and low blood pressure. Your pulse may be weak or may be faster than 100 beats a minute when you are sitting still.  · Other changes, such as:  ? Feeling very thirsty.  ? Sunken eyes.  ? Cold hands and feet.  ? Confusion.  ? Being very tired (lethargic) or having trouble waking from sleep.  ? Short-term weight loss.  ? Loss of consciousness.  How is this diagnosed?  This condition is diagnosed based on your symptoms and a physical exam. You may have blood and urine tests to help confirm the diagnosis.  How is this treated?  Treatment for this condition depends on how severe it is. Treatment should be started right away. Do not wait until dehydration becomes severe. Severe dehydration is an emergency and needs to be treated in a hospital.  · Mild or moderate dehydration can be treated at home. You may be asked to:  ? Drink more fluids.  ? Drink an oral rehydration solution (ORS). This drink helps restore proper amounts of fluids and salts and minerals in the blood (electrolytes).  · Severe dehydration can be treated:  ? With IV fluids.  ? By correcting abnormal levels of electrolytes. This is often done by giving electrolytes through a tube that is passed through your nose and into your stomach (nasogastric tube, or NG tube).  ? By treating the underlying cause of dehydration.  Follow these instructions at home:  Oral rehydration solution  If told by your health care provider, drink an ORS:  · Make an ORS by following instructions on the package.  · Start by drinking small amounts, about ½ cup (120 mL) every 5-10 minutes.  · Slowly increase how much you drink until you have taken the amount recommended by your health care  provider.  Eating and drinking              · Drink enough clear fluid to keep your urine pale yellow. If you were told to drink an ORS, finish the ORS first and then start slowly drinking other clear fluids. Drink fluids such as:  ? Water. Do not drink only water. Doing that can lead to hyponatremia, which is having too little salt (sodium) in the body.  ? Water from ice chips you suck on.  ? Fruit juice that you have added water to (diluted fruit juice).  ? Low-calorie sports drinks.  · Eat foods that contain a healthy balance of electrolytes, such as bananas, oranges, potatoes, tomatoes, and spinach.  · Do not drink alcohol.  · Avoid the following:  ? Drinks that contain a lot of sugar. These include high-calorie sports drinks, fruit juice that is not diluted, and soda.  ? Caffeine.  ? Foods that are greasy or contain a lot of fat or sugar.  General instructions  · Take over-the-counter and prescription medicines only as told by your health care provider.  · Do not take sodium tablets. Doing that can lead to having too much sodium in the body (hypernatremia).  · Return to your normal activities as told by your health care provider. Ask your health care provider what activities are safe for you.  · Keep all follow-up visits as told by your health care provider. This is important.  Contact a health care provider if:  · You have muscle cramps, pain, or discomfort, such as:  ? Pain in your abdomen and the pain gets worse or stays in one area (localizes).  ? Stiff neck.  · You have a rash.  · You are more irritable than usual.  · You are sleepier or have a harder time waking than usual.  · You feel weak or dizzy.  · You feel very thirsty.  Get help right away if you have:  · Any symptoms of severe dehydration.  · Symptoms of vomiting, such as:  ? You cannot eat or drink without vomiting.  ? Vomiting gets worse or does not go away.  ? Vomit includes blood or green matter (bile).  · Symptoms that get worse with  treatment.  · A fever.  · A severe headache.  · Problems with urination or bowel movements, such as:  ? Diarrhea that gets worse or does not go away.  ? Blood in your stool (feces). This may cause stool to look black and tarry.  ? Not urinating, or urinating only a small amount of very dark urine, within 6-8 hours.  · Trouble breathing.  These symptoms may represent a serious problem that is an emergency. Do not wait to see if the symptoms will go away. Get medical help right away. Call your local emergency services (911 in the [place] Do not drive yourself to the hospital.  Summary  · Dehydration is a condition in which there is not enough water or other fluids in the body. This happens when a person loses more fluids than he or she takes in.  · Treatment for this condition depends on how severe it is. Treatment should be started right away. Do not wait until dehydration becomes severe.  · Drink enough clear fluid to keep your urine pale yellow. If you were told to drink an oral rehydration solution (ORS), finish the ORS first and then start slowly drinking other clear fluids.  · Take over-the-counter and prescription medicines only as told by your health care provider.  · Get help right away if you have any symptoms of severe dehydration.  This information is not intended to replace advice given to you by your health care provider. Make sure you discuss any questions you have with your health care provider.  Document Revised: 07/30/2020 Document Reviewed: 07/30/2020  [employer] Patient Education © 2021 [employer]        Follow Up:   Return if symptoms worsen or fail to improve.        CHANTELLE Kirk  [facility] [address]    Please note that portions of this note were completed with a voice recognition program.

## 2023-06-14 NOTE — ED ADULT NURSE REASSESSMENT NOTE - NS ED NURSE REASSESS COMMENT FT1
Assumed care of pt at 19:15 as stated in report from CAROL Naqvi. Charting as noted. Patient A&O x4, denies pain/discomfort, denies CP/SOB. Updated on the plan of care. Call bell within reach, bed locked in lowest position. IV site flushed w/ NS. No redness, swelling or pain noted to site. No signs of acute distress noted, safety maintained. Pt remains on CM in NSR.
pt resting comfortably showing no signs of respiratory distress or pain, the pt is calm and cooperative, pt on cardiac monitor in NSR
pt resting comfortably showing no signs of respiratory distress or pain, the pt is calm and cooperative, pt on cardiac monitor in NSR
Assumed care of the patient at 1700. Verbal report received from Jaime [facility]INA ED. Patient A&Ox4. Patient with persistent pain to RLE, medicated with oxycodone and tylenol as per orders. RLE noted with dressing that per patient were changed today. Dressings c/d/i. Right buttock noted with stage II and sacral area with stage I, barrier cream applied, Perineal care provided, frequent T&P. Patient voiding in urinal clear yellow urine and on bedpan. Contact precautions maintained. Patient pending Cardiology consult. Patient placed on CM as per orders. VSS, afebrile. Patient in understanding of plan of care. Patient with no further questions for the RN. Resting in comfort. Call bell within reach and encouraged to use when assistance needed. Will continue to monitor.
Assumed care of the patient @0202. Pt A&Ox4, VSS afebrile. Pt c/o 4/10 right LLE pain, medicated as ordered with Tylenol. RLE swelling and bruising noted. Pt awaiting [facility] transfer. Patient in understanding of plan of care. Patient with no further questions for the RN. Resting in comfort. Call bell within reach and encouraged to use when assistance needed. Will continue to monitor.

## 2023-06-14 NOTE — ED CDU PROVIDER DISPOSITION NOTE - NSFOLLOWUPINSTRUCTIONS_ED_ALL_ED_FT
Pneumonia    Pneumonia is an infection of the lungs. Pneumonia may be caused by bacteria, viruses, or funguses. Symptoms include coughing, fever, chest pain when breathing deeply or coughing, shortness of breath, fatigue, or muscle aches. Pneumonia can be diagnosed with a medical history and physical exam, as well as other tests which may include a chest X-ray. If you were prescribed an antibiotic medicine, take it as told by your health care provider and do not stop taking the antibiotic even if you start to feel better. Do not use tobacco products, including cigarettes, chewing tobacco, and e-cigarettes.    SEEK IMMEDIATE MEDICAL CARE IF YOU HAVE ANY OF THE FOLLOWING SYMPTOMS: worsening shortness of breath, worsening chest pain, coughing up blood, change in mental status, lightheadedness/dizziness.    Current Medication regimen  · 	gabapentin 400 mg oral capsule: 1 cap(s) orally once a day (at bedtime)  · 	naproxen 250 mg oral tablet: 1 tab(s) orally 2 times a day  · 	oxyCODONE 10 mg oral tablet: 1 tab(s) orally every 4 hours As needed Severe Pain (7 - 10)  · 	oxyCODONE 5 mg oral tablet: 1 tab(s) orally every 4 hours As needed Moderate Pain (4 - 6)  · 	enoxaparin: 40 milligram(s) subcutaneous once a day  · 	metoprolol tartrate 50 mg oral tablet: 1 tab(s) orally 2 times a day  · 	polyethylene glycol 3350 oral powder for reconstitution: 17 gram(s) orally once a day  · 	senna leaf extract oral tablet: 2 tab(s) orally once a day (at bedtime)  · 	verapamil 120 mg oral tablet: 1 tab(s) orally 2 times a day  · 	Tylenol: 650 milligram(s) orally every 6 hours  · 	naloxone 4 mg/0.1 mL nasal spray: 4 spray intranasally once  · 	omeprazole 20 mg oral delayed release capsule: 1 cap(s) orally once a day  · 	finasteride 5 mg oral tablet: 1 tab(s) orally once a day  · 	rosuvastatin 20 mg oral tablet: 1 tab(s) orally once a day                Doxycycline 100 mg oral tablet: 1 tab(s) orally twice a day for 7 days Pneumonia    Pneumonia is an infection of the lungs. Pneumonia may be caused by bacteria, viruses, or funguses. Symptoms include coughing, fever, chest pain when breathing deeply or coughing, shortness of breath, fatigue, or muscle aches. Pneumonia can be diagnosed with a medical history and physical exam, as well as other tests which may include a chest X-ray. If you were prescribed an antibiotic medicine, take it as told by your health care provider and do not stop taking the antibiotic even if you start to feel better. Do not use tobacco products, including cigarettes, chewing tobacco, and e-cigarettes.    SEEK IMMEDIATE MEDICAL CARE IF YOU HAVE ANY OF THE FOLLOWING SYMPTOMS: worsening shortness of breath, worsening chest pain, coughing up blood, change in mental status, lightheadedness/dizziness.    Current Medication regimen  · 	gabapentin 400 mg oral capsule: 1 cap(s) orally once a day (at bedtime)  · 	naproxen 250 mg oral tablet: 1 tab(s) orally 2 times a day  · 	oxyCODONE 10 mg oral tablet: 1 tab(s) orally every 4 hours As needed Severe Pain (7 - 10)  · 	oxyCODONE 5 mg oral tablet: 1 tab(s) orally every 4 hours As needed Moderate Pain (4 - 6)  · 	enoxaparin: 40 milligram(s) subcutaneous once a day  · 	polyethylene glycol 3350 oral powder for reconstitution: 17 gram(s) orally once a day  · 	senna leaf extract oral tablet: 2 tab(s) orally once a day (at bedtime)  · 	verapamil 120 mg oral tablet: 1 tab(s) orally 2 times a day  · 	Tylenol: 650 milligram(s) orally every 6 hours  · 	naloxone 4 mg/0.1 mL nasal spray: 4 spray intranasally once  · 	omeprazole 20 mg oral delayed release capsule: 1 cap(s) orally once a day  · 	finasteride 5 mg oral tablet: 1 tab(s) orally once a day  · 	rosuvastatin 20 mg oral tablet: 1 tab(s) orally once a day                Doxycycline 100 mg oral tablet: 1 tab(s) orally twice a day for 7 days    Metoprolol was discharged from medication regimen

## 2023-06-14 NOTE — CHART NOTE - NSCHARTNOTEFT_GEN_A_CORE
SOCIAL WORK NOTE: MADE ARRANGMEENTS FOR THE PATIENT TO BE TRANSFERRED TO [facility] TODAY AS WAS CHOSEN BY THE PATIENT YESTERDAY. CONFIRMED THIS MORNING THEY CAN STILL ACCOMMODATE PRIVATE ROOM AND THAT THEY CAN STILL OFFER TODAY. TRANSPORT BILLING FORM PROVIDED TO PATIENT AND SNF PACKET AND NEAF PLACED ON DC RACK. PATIENT AND ED STAFF AWARE OF TRANSFER TIME.
SOCIAL WORK NOTE:  PATIENT WAS DISCHARGED FROM [facility] AT [facility] YESTERDAY AND WAS TRANSFERRED TO [facility] FOR [facility]. THIS WAS PATIENT'S REQUEST. PATENT WAS THERE X 5 HOURS AND PATIENT IS NOW REFUSING TO RETURN AS HE HAD A BAD EXPERIENCE IN SMALL AMOUNT OF TIME. PATIENT NOW REQUESTING ANOTHER SNF. [facility] AND CL[facility]ALS UPLOADED AND CIRCUALTING TO MANY LOCAL FACILTIES, PATIENT INQUIRING ABOUT [facility].

## 2023-06-14 NOTE — ED CDU PROVIDER SUBSEQUENT DAY NOTE - ATTENDING APP SHARED VISIT CONTRIBUTION OF CARE
68y Male s/p right femur ORIF with fever  found to have PNA on CT. On doxycyline. Seen by orthopedics, no suspected complications of surgery. Seen and cleared by cards as well . Sw/CM following for new [facility] placement
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
## 2023-06-14 NOTE — ED CDU PROVIDER DISPOSITION NOTE - ATTENDING APP SHARED VISIT CONTRIBUTION OF CARE
68-year-old male with history of hypertension, hyperlipidemia, recent right femur surgery presents to the ED with fever.  Patient had a fracture of his distal femur that was repaired by Ortho, discharged to rehab yesterday. pt had ct performed showing Branching "tree in bud" and groundglass opacities in the right middle lobe likely infectious in etiology. Pt started on doxycyline, afebrile overnight, pt to be discharged to [facility], Pt reassessed, pt feeling better at this time, vss, pt able to walk, talk and vocalized plan of action. Discussed in depth and explained to pt in depth the next steps that need to be taking including proper follow up with PCP or specialists. All incidental findings were discussed with pt as well. Pt verbalized their concerns and all questions were answered. Pt understands dispo and wants discharge. Given good instructions when to return to ED and importance of f/u.

## 2023-06-14 NOTE — ED CDU PROVIDER DISPOSITION NOTE - CLINICAL COURSE
68-year-old male with history of hypertension, hyperlipidemia, recent right femur surgery presents to the ED with fever.  Patient had a fracture of his distal femur that was repaired by Ortho, discharged to rehab yesterday. pt had ct performed showing Branching "tree in bud" and groundglass opacities in the right middle lobe likely infectious in etiology. Pt started on doxycyline, afebrile overnight, pt to be discharged to [facility], Pt reassessed, pt feeling better at this time, vss, pt able to walk, talk and vocalized plan of action. Discussed in depth and explained to pt in depth the next steps that need to be taking including proper follow up with PCP or specialists. All incidental findings were discussed with pt as well. Pt verbalized their concerns and all questions were answered. Pt understands dispo and wants discharge. Given good instructions when to return to ED and importance of f/u.

## 2023-06-14 NOTE — PROGRESS NOTE ADULT - SUBJECTIVE AND OBJECTIVE BOX
[facility]                                                         CARDIOLOGY AT [facility]                                                                  [address], [place] [address]                                                         Telephone: [phone]. Fax:[phone]                                                                             PROGRESS NOTE    Reason for follow up:   Update:       Review of symptoms:   Cardiac:  No chest pain. No dyspnea. No palpitations.  Respiratory: no cough. No dyspnea  Gastrointestinal: No diarrhea. No abdominal pain. No bleeding.   Neuro: No focal neuro complaints.      Vitals:  T(C): 37.3 (06-14-23 @ 07:35), Max: 37.4 (06-14-23 @ 05:42)  HR: 84 (06-14-23 @ 07:35) (84 - 101)  BP: 109/68 (06-14-23 @ 07:35) (97/63 - 132/66)  RR: 18 (06-14-23 @ 07:35) (17 - 18)  SpO2: 97% (06-14-23 @ 07:35) (97% - 98%)  Wt(kg): --  I&O's Summary    Weight (kg): 72.6 (06-12 @ 23:51)      PHYSICAL EXAM:  Appearance: Comfortable. No acute distress  HEENT:  Atraumatic. Normocephalic.  Normal oral mucosa  Neurologic: A & O x 3, no gross focal deficits.  Cardiovascular: RRR S1 S2, No murmur, no rubs/gallops. No JVD  Respiratory: Lungs clear to auscultation, unlabored   Gastrointestinal:  Soft, Non-tender, + BS  Lower Extremities: 2+ Peripheral Pulses, No clubbing, cyanosis, or edema  Psychiatry: Patient is calm. No agitation.   Skin: warm and dry.      CURRENT CARDIAC MEDICATIONS:  verapamil 120 milliGRAM(s) Oral two times a day        CURRENT OTHER MEDICATIONS:  doxycycline monohydrate Capsule 100 milliGRAM(s) Oral every 12 hours  acetaminophen     Tablet .. 650 milliGRAM(s) Oral every 6 hours PRN Mild Pain (1 - 3)  pantoprazole    Tablet 40 milliGRAM(s) Oral before breakfast  senna 2 Tablet(s) Oral at bedtime  atorvastatin 80 milliGRAM(s) Oral at bedtime  finasteride 5 milliGRAM(s) Oral daily  enoxaparin Injectable 40 milliGRAM(s) SubCutaneous every 12 hours        LABS:	 	  ( 07 Jun 2023 05:45 )  Troponin T  0.16<H>,  CPK  X    , CKMB  X    , BNP X        , ( 06 Jun 2023 21:20 )  Troponin T  0.16<H>,  CPK  X    , CKMB  X    , BNP X        , ( 06 Jun 2023 16:42 )  Troponin T  0.18<H>,  CPK  X    , CKMB  X    , BNP X                                  8.5    9.18  )-----------( 298      ( 13 Jun 2023 01:16 )             26.6     06-13    136  |  102  |  16.7  ----------------------------<  108<H>  3.9   |  24.0  |  0.69    Ca    8.6      13 Jun 2023 01:16    TPro  5.8<L>  /  Alb  2.7<L>  /  TBili  1.0  /  DBili  x   /  AST  35  /  ALT  26  /  AlkPhos  98  06-13    PT/INR/PTT ( 13 Jun 2023 01:16 )                       :                       :      14.4         :       30.8                  .        .                   .              .           .       1.24        .                                       Lipid Profile:   HgA1c:   TSH:       TELEMETRY:   ECG:      DIAGNOSTIC TESTING:  [ ] Echocardiogram:   [ ]  Catheterization:  [ ] Stress Test:    OTHER: 	                                                                [facility]                                                         CARDIOLOGY AT [facility]                                                                  [address], [place] [address]                                                         Telephone: [phone]. Fax:[phone]                                                                             PROGRESS NOTE    Reason for follow up: HCM  Update: c/o diarrhea. refused metoprolol this am, 2/2 history of diarhea with medication  denies chest pain, shortenss of breath. tele SR  tachycardia related to fever  cont verapamil       Review of symptoms:   Cardiac:  No chest pain. No dyspnea. No palpitations.  Respiratory: no cough. No dyspnea  Gastrointestinal: No diarrhea. No abdominal pain. No bleeding.   Neuro: No focal neuro complaints.      Vitals:  T(C): 37.3 (06-14-23 @ 07:35), Max: 37.4 (06-14-23 @ 05:42)  HR: 84 (06-14-23 @ 07:35) (84 - 101)  BP: 109/68 (06-14-23 @ 07:35) (97/63 - 132/66)  RR: 18 (06-14-23 @ 07:35) (17 - 18)  SpO2: 97% (06-14-23 @ 07:35) (97% - 98%)      Weight (kg): 72.6 (06-12 @ 23:51)      PHYSICAL EXAM:  Appearance: Comfortable. No acute distress  HEENT:  Atraumatic. Normocephalic.  Normal oral mucosa  Neurologic: A & O x 3, no gross focal deficits.  Cardiovascular: RRR S1 S2, No murmur, no rubs/gallops. No JVD  Respiratory: Lungs clear to auscultation, unlabored   Gastrointestinal:  Soft, Non-tender, + BS  Lower Extremities: No edema  Psychiatry: Patient is calm. No agitation.   Skin: warm and dry.        CURRENT CARDIAC MEDICATIONS:  verapamil 120 milliGRAM(s) Oral two times a day        CURRENT OTHER MEDICATIONS:  doxycycline monohydrate Capsule 100 milliGRAM(s) Oral every 12 hours  acetaminophen     Tablet .. 650 milliGRAM(s) Oral every 6 hours PRN Mild Pain (1 - 3)  pantoprazole    Tablet 40 milliGRAM(s) Oral before breakfast  senna 2 Tablet(s) Oral at bedtime  atorvastatin 80 milliGRAM(s) Oral at bedtime  finasteride 5 milliGRAM(s) Oral daily  enoxaparin Injectable 40 milliGRAM(s) SubCutaneous every 12 hours        LABS:	 	  ( 07 Jun 2023 05:45 )  Troponin T  0.16<H>,  CPK  X    , CKMB  X    , BNP X      , ( 06 Jun 2023 21:20 )  Troponin T  0.16<H>,  CPK  X    , CKMB  X    , BNP X      , ( 06 Jun 2023 16:42 )  Troponin T  0.18<H>,  CPK  X    , CKMB  X    , BNP X                                  8.5    9.18  )-----------( 298      ( 13 Jun 2023 01:16 )             26.6     06-13    136  |  102  |  16.7  ----------------------------<  108<H>  3.9   |  24.0  |  0.69    Ca    8.6      13 Jun 2023 01:16    TPro  5.8<L>  /  Alb  2.7<L>  /  TBili  1.0  /  DBili  x   /  AST  35  /  ALT  26  /  AlkPhos  98  06-13    PT/INR/PTT ( 13 Jun 2023 01:16 )                       :                       :      14.4         :       30.8                  .        .                   .              .           .       1.24        .                                         TELEMETRY:       DIAGNOSTIC TESTING:  [ ] Echocardiogram:   < from: TTE Echo Complete w/ Contrast w/ Doppler (06.05.23 @ 20:20) >  Summary:   1. Technically difficult study.   2. Left ventricular ejection fraction, by visual estimation, is 60 to 65%.   3. Normal globalleft ventricular systolic function.   4. There is severe concentric left ventricular hypertrophy.   5. Hypertrophic CMP with LVOT max gradient 96 mmHg.   6. Severely enlarged left atrium.   7. Degenerative mitral valve.   8. Mild to moderate mitral annular calcification.   9. Moderate anterior leaflet systolic anterior motion of the mitral valve.  10. Moderate to severe mitral valve regurgitation.  11. Prolapse of posterior leaflet of the mitral valve.  12. Mild tricuspid regurgitation.  13. Mild aortic regurgitation.  14. Sclerotic aortic valve with decreased opening.  15. Estimated pulmonary artery systolic pressure is 39.2 mmHg assuming a   right atrial pressure of 3 mmHg, which is consistent with borderline   pulmonary hypertension.  16. There is a large liver cyst 8.7cm x 9.12 cm, consider dedicated liver imaging.    MD Claudio Electronically signed on 6/6/2023 at 8:32:16 AM      < end of copied text >

## 2023-06-14 NOTE — ED ADULT NURSE REASSESSMENT NOTE - NSFALLRISKFACTORS_ED_ALL_ED
Surgery: Recent surgery, recent lower limb amputation, major abdominal or thoracic surgery
Surgery: Recent surgery, recent lower limb amputation, major abdominal or thoracic surgery

## 2023-06-14 NOTE — PROGRESS NOTE ADULT - ASSESSMENT
HOCM    d/c metoprolol- pt with consistent diarrhea in past (refused dose this am)  cotn verapamil     no further cardiac work up at this time  Thank you for allowing me to participate in care of your patient.   Please call as needed.  Follow up with Dr. Poe as out patient 68M PMH CAD (CCTA LAD 50%, OM 40%), HTN, HLD, HOCM, ?pAF initially presenting s/p fall from 10ft on ladder, right femur fx s/p IM nail. Cardiology following for HOCM management pre and post op. Pt subsequently discharged to rehab and came back to [facility] due to fever and tachycardia. Cardiology reconsulted for rate control.

## 2023-06-14 NOTE — ED CDU PROVIDER DISPOSITION NOTE - CARE PROVIDER_API CALL
Pepe Rod  Orthopaedic Surgery  [address] [place] [id number]  Phone: [phone]  Fax: [phone]  Follow Up Time:

## 2023-06-14 NOTE — ED CDU PROVIDER DISPOSITION NOTE - PATIENT PORTAL LINK FT
You can access the FollowMyHealth Patient Portal offered by [facility] by registering at the following website: [email or web address]. By joining [facility]’s FollowMyHealth portal, you will also be able to view your health information using other applications (apps) compatible with our system.

## 2023-06-14 NOTE — DISCHARGE NOTE NURSING/CASE MANAGEMENT/SOCIAL WORK - NSDCPEFALRISK_GEN_ALL_CORE
For information on Fall & Injury Prevention, visit: [email or web address] OR  [email or web address] OR  https://www.cdc.gov/steadi/patient.html

## 2023-06-15 ENCOUNTER — APPOINTMENT (OUTPATIENT)
Dept: CARDIOLOGY | Facility: CLINIC | Age: 68
End: 2023-06-15

## 2023-06-16 LAB
CULTURE RESULTS: SIGNIFICANT CHANGE UP
SPECIMEN SOURCE: SIGNIFICANT CHANGE UP

## 2023-06-18 LAB
CULTURE RESULTS: SIGNIFICANT CHANGE UP
CULTURE RESULTS: SIGNIFICANT CHANGE UP
SPECIMEN SOURCE: SIGNIFICANT CHANGE UP
SPECIMEN SOURCE: SIGNIFICANT CHANGE UP

## 2023-06-26 ENCOUNTER — APPOINTMENT (OUTPATIENT)
Dept: ORTHOPEDIC SURGERY | Facility: CLINIC | Age: 68
End: 2023-06-26
Payer: MEDICARE

## 2023-06-26 DIAGNOSIS — S72.91XA UNSPECIFIED FRACTURE OF RIGHT FEMUR, INITIAL ENCOUNTER FOR CLOSED FRACTURE: ICD-10-CM

## 2023-06-26 PROCEDURE — 99024 POSTOP FOLLOW-UP VISIT: CPT

## 2023-07-26 ENCOUNTER — APPOINTMENT (OUTPATIENT)
Dept: ORTHOPEDIC SURGERY | Facility: CLINIC | Age: 68
End: 2023-07-26
Payer: MEDICARE

## 2023-07-26 PROCEDURE — 73552 X-RAY EXAM OF FEMUR 2/>: CPT | Mod: RT

## 2023-07-26 PROCEDURE — 99024 POSTOP FOLLOW-UP VISIT: CPT

## 2023-07-26 NOTE — HISTORY OF PRESENT ILLNESS
[Clean/Dry/Intact] : clean, dry and intact [Erythema] : not erythematous [Discharge] : absent of discharge [Swelling] : swollen [Neuro Intact] : an unremarkable neurological exam [Vascular Intact] : ~T peripheral vascular exam normal [Doing Well] : is doing well [Excellent Pain Control] : has excellent pain control [No Sign of Infection] : is showing no signs of infection [None] : None [de-identified] : s/p Open reduction and internal fixation, right intercondylar distal femur fracture.\par  Intramedullary nail, right femur. DOS:06/06/23 [de-identified] : 68-year-old male presents for postoperative follow-up s/p Open reduction and internal fixation, right intercondylar distal femur fracture.\par  Intramedullary nail, right femur. DOS:06/06/23\par \par He is doing well.  He is ambulating with a quad cane.  He is no longer using narcotics or gabapentin.  He went to see a pain management physician but felt he was a "quack" and stopped using his pain meds [de-identified] : Physical Exam:\par General: Well appearing, no acute distress, A&O\par Neurologic: A&Ox3, No focal deficits\par Head: NCAT without abrasions, lacerations, or ecchymosis to head, face, or scalp\par Respiratory: Equal chest wall expansion bilaterally, no accessory muscle use\par Lymphatic: No lymphadenopathy palpated\par Skin: Warm and dry\par Psychiatric: Normal mood and affect\par \par SILT s/s/sp/dp/t\par Fires EHL/FHL/GS/TA\par 2+ DP/PT pulse\par brisk capillary refill [de-identified] : 4 views of the right femur obtained today show stable intramedullary and sideplate fixation of a distal femur fracture with excellent increasing callus formation [de-identified] : He will continue physical therapy.  No restrictions at the present time.  As long as he continues to improve he may follow-up as needed otherwise we will see him back in about 6 weeks with repeat x-rays.\par \par The question of when to drive is impossible to generalize to everyone because it is largely dependent on the individual.  Importantly, doctors do not have a license with the DMV to "clear you" or "release you" to return to driving.  There are 3 primary criteria that must be met.  You need to be off of narcotic pain medicines (otherwise you are driving under the influence).  You need to be able to get in and out of the 's seat comfortably.  And you must have regained your normal reflexes / strength.  Also, return to driving depends partly on what side had surgery (ie. Right leg operates the pedals; people with Left side surgery can generally get back to driving much sooner unless you have a clutch).  The average time to return to driving is around 2 weeks after you return to normal walking for the right side and usually sooner for the left.  We recommend 'testing' yourself with another licensed  in an empty parking lot or quiet street first in order to check your reflexes moving your foot from pedal to pedal.\par \par The patient was given the opportunity to ask questions and all questions were answered to their satisfaction.\par \par Pepe Rod MD\par Orthopaedic Trauma Surgeon\par [facility]\par [facility] [place]\par Director Orthopaedic Trauma, [facility]\par \par \par \par

## 2023-08-07 ENCOUNTER — TRANSCRIPTION ENCOUNTER (OUTPATIENT)
Age: 68
End: 2023-08-07

## 2023-09-06 ENCOUNTER — APPOINTMENT (OUTPATIENT)
Dept: ORTHOPEDIC SURGERY | Facility: CLINIC | Age: 68
End: 2023-09-06
Payer: MEDICARE

## 2023-09-06 DIAGNOSIS — S72.91XD UNSPECIFIED FRACTURE OF RIGHT FEMUR, SUBSEQUENT ENCOUNTER FOR CLOSED FRACTURE WITH ROUTINE HEALING: ICD-10-CM

## 2023-09-06 PROCEDURE — 73552 X-RAY EXAM OF FEMUR 2/>: CPT | Mod: RT

## 2023-09-06 PROCEDURE — 99214 OFFICE O/P EST MOD 30 MIN: CPT

## 2023-09-06 NOTE — HISTORY OF PRESENT ILLNESS
[Clean/Dry/Intact] : clean, dry and intact [Healed] : healed [Swelling] : swollen [Xray (Date:___)] : [unfilled] Xray -  [Doing Well] : is doing well [Adequate Pain Control] : has adequate pain control [Chills] : no chills [Diarrhea] : no diarrhea [Fever] : no fever [Nausea] : no nausea [Vomiting] : no vomiting [Erythema] : not erythematous [Discharge] : absent of discharge [de-identified] : s/p  Open reduction and internal fixation, right intercondylar distal femur fracture.  Intramedullary nail, right femur. DOS: 06/06/23 [de-identified] : 68-year-old male presents for postoperative follow-up s/p Open reduction and internal fixation, right intercondylar distal femur fracture (6/6/23). He is doing well. He is ambulating without assistive devices but reports he carries a cane for long distances. Patient states pain is well controlled at this time, he takes tylenol and nsaids prn. Denies any numbness or tingling. Denies fevers/chills. No other orthopaedic concerns at this time. [de-identified] : Gen: NAD, A/Ox3, following commands  Right lower extremity: Well healed incisions, no erythema, drainage or ecchymosis. + swelling about the knee compared to contralateral limb. No bony tenderness to palpation +EHL/FHL/TA/GSC +SILT L3-S1 + DP Compartments soft and compressible No calf tenderness [de-identified] : Imaging: AP and lateral radiographs of right femur were ordered, obtained, and reviewed today in clinic demonstrating s/p ORIF+IMN of distal femur, well aligned, no signs of hardware failure, + callus/bony healing.  [de-identified] : A/P: The patient is a 68 year old Male s/p ORIF/IMN right distal femur.  Clinical findings and x-ray results were reviewed at length with the patient. At this time the patient may continue participating in all physical activities without restrictions. Physical therapy was recommended and a script was provided at this visit. No other orthopedic intervention was deemed necessary at this time. All questions and concerns were addressed. Patient vocalized understanding and agreement to assessment and treatment plan. Recommend follow up w/ on an as needed basis. Call office to schedule appointment. Imaging and clinical presentation discussed w/ Dr. Rod who is aware and agrees w/ above plan. [de-identified] : Orthopaedic Trauma Surgeon Addendum:  I agree with the above resident physician note.   Chart was reviewed and patient examined in the orthopedic office. I agree with the assessment and plan of the resident.  I was physically present for the key portions of the evaluation and management service provided. I agree with the above history, physical and plan. Appropriate imaging has been reviewed and the plan adjusted as needed.  Pepe Rod MD Orthopaedic Trauma Surgeon [facility] [place]

## 2023-09-27 NOTE — H&P PST ADULT - RESPIRATORY RATE (BREATHS/MIN)
Head Injury, Adult    There are many types of head injuries. They can be as minor as a small bump. Some head injuries can be worse. Worse injuries include:  A strong hit to the head that shakes the brain back and forth, causing damage (concussion).  A bruise (contusion) of the brain. This means there is bleeding in the brain that can cause swelling.  A cracked skull (skull fracture).  Bleeding in the brain that gathers, gets thick (makes a clot), and forms a bump (hematoma).  Most problems from a head injury come in the first 24 hours. However, you may still have side effects up to 7–10 days after your injury. It is important to watch your condition for any changes. You may need to be watched in the emergency department or urgent care, or you may need to stay in the hospital.    What are the causes?  There are many possible causes of a head injury. A serious head injury may be caused by:  A car accident.  Bicycle or motorcycle accidents.  Sports injuries.  Falls.  Being hit by an object.  What are the signs or symptoms?  Symptoms of a head injury include a bruise, bump, or bleeding where the injury happened. Other physical symptoms may include:  Headache.  Feeling like you may vomit (nauseous) or vomiting.  Dizziness.  Blurred or double vision.  Being uncomfortable around bright lights or loud noises.  Shaking movements that you cannot control (seizures).  Feeling tired.  Trouble being woken up.  Fainting or loss of consciousness.  Mental or emotional symptoms may include:  Feeling grumpy or cranky.  Confusion and memory problems.  Having trouble paying attention or concentrating.  Changes in eating or sleeping habits.  Feeling worried or nervous (anxious).  Feeling sad (depressed).  How is this treated?  Treatment for this condition depends on how severe the injury is and the type of injury you have. The main goal is to prevent problems and to allow the brain time to heal.    Mild head injury    If you have a mild head injury, you may be sent home, and treatment may include:  Being watched. A responsible adult should stay with you for 24 hours after your injury and check on you often.  Physical rest.  Brain rest.  Pain medicines.  Severe head injury    If you have a severe head injury, treatment may include:  Being watched closely. This includes staying in the hospital.  Medicines to:  Help with pain.  Prevent seizures.  Help with brain swelling.  Protecting your airway and using a machine that helps you breathe (ventilator).  Treatments to watch for and manage swelling inside the brain.  Brain surgery. This may be needed to:  Remove a collection of blood or blood clots.  Stop the bleeding.  Remove a part of the skull. This allows room for the brain to swell.  Follow these instructions at home:  Activity    Rest.  Avoid activities that are hard or tiring.  Make sure you get enough sleep.  Let your brain rest. Do this by limiting activities that need a lot of thought or attention, such as:  Watching TV.  Playing memory games and puzzles.  Job-related work or homework.  Working on the computer, social media, and texting.  Avoid activities that could cause another head injury until your doctor says it is okay. This includes playing sports. Having another head injury, especially before the first one has healed, can be dangerous.  Ask your doctor when it is safe for you to go back to your normal activities, such as work or school. Ask your doctor for a step-by-step plan for slowly going back to your normal activities.  Ask your doctor when you can drive, ride a bicycle, or use heavy machinery. Do not do these activities if you are dizzy.  Lifestyle      Do not drink alcohol until your doctor says it is okay.  Do not use drugs.  If it is harder than usual to remember things, write them down.  If you are easily distracted, try to do one thing at a time.  Talk with family members or close friends when making important decisions.  Tell your friends, family, a trusted co-worker, and  about your injury, symptoms, and limits (restrictions). Have them watch for any problems that are new or getting worse.  General instructions    Take over-the-counter and prescription medicines only as told by your doctor.  Have someone stay with you for 24 hours after your head injury. This person should watch you for any changes in your symptoms and be ready to get help.  Keep all follow-up visits as told by your doctor. This is important.  How is this prevented?    Work on your balance and strength. This can help you avoid falls.  Wear a seat belt when you are in a moving vehicle.  Wear a helmet when you:  Ride a bicycle.  Ski.  Do any other sport or activity that has a risk of injury.  If you drink alcohol:  Limit how much you use to:  0–1 drink a day for nonpregnant women.  0–2 drinks a day for men.  Be aware of how much alcohol is in your drink. In the [place] one drink equals one 12 oz bottle of beer (355 mL), one 5 oz glass of wine (148 mL), or one 1½ oz glass of hard liquor (44 mL).  Make your home safer by:  Getting rid of clutter from the floors and stairs. This includes things that can make you trip.  Using grab bars in bathrooms and handrails by stairs.  Placing non-slip mats on floors and in bathtubs.  Putting more light in dim areas.  Where to find more information  Centers for Disease Control and Prevention: www.cdc.gov  Get help right away if:  You have:  A very bad headache that is not helped by medicine.  Trouble walking or weakness in your arms and legs.  Clear or bloody fluid coming from your nose or ears.  Changes in how you see (vision).  A seizure.  More confusion or more grumpy moods.  Your symptoms get worse.  You are sleepier than normal and have trouble staying awake.  You lose your balance.  The black centers of your eyes (pupils) change in size.  Your speech is slurred.  Your dizziness gets worse.  You vomit.  These symptoms may be an emergency. Do not wait to see if the symptoms will go away. Get medical help right away. Call your local emergency services (911 in the [place] Do not drive yourself to the hospital.    Summary  Head injuries can be as minor as a small bump. Some head injuries can be worse.  Treatment for this condition depends on how severe the injury is and the type of injury you have.  Have someone stay with you for 24 hours after your head injury.  Ask your doctor when it is safe for you to go back to your normal activities, such as work or school.  To prevent a head injury, wear a seat belt in a car, wear a helmet when you use a bicycle, limit your alcohol use, and make your home safer.  This information is not intended to replace advice given to you by your health care provider. Make sure you discuss any questions you have with your health care provider. 16

## 2023-12-15 ENCOUNTER — APPOINTMENT (OUTPATIENT)
Dept: CARDIOTHORACIC SURGERY | Facility: CLINIC | Age: 68
End: 2023-12-15
Payer: MEDICARE

## 2023-12-15 VITALS
SYSTOLIC BLOOD PRESSURE: 126 MMHG | HEART RATE: 83 BPM | RESPIRATION RATE: 16 BRPM | OXYGEN SATURATION: 98 % | HEIGHT: 70 IN | WEIGHT: 183 LBS | BODY MASS INDEX: 26.2 KG/M2 | DIASTOLIC BLOOD PRESSURE: 80 MMHG

## 2023-12-15 DIAGNOSIS — I48.0 PAROXYSMAL ATRIAL FIBRILLATION: ICD-10-CM

## 2023-12-15 DIAGNOSIS — I34.0 NONRHEUMATIC MITRAL (VALVE) INSUFFICIENCY: ICD-10-CM

## 2023-12-15 PROCEDURE — 99204 OFFICE O/P NEW MOD 45 MIN: CPT

## 2023-12-15 RX ORDER — OMEPRAZOLE 20 MG/1
20 CAPSULE, DELAYED RELEASE ORAL
Qty: 90 | Refills: 0 | Status: COMPLETED | COMMUNITY
Start: 2022-05-10 | End: 2023-12-15

## 2023-12-15 RX ORDER — TRIAMTERENE AND HYDROCHLOROTHIAZIDE 25; 37.5 MG/1; MG/1
37.5-25 TABLET ORAL
Refills: 0 | Status: ACTIVE | COMMUNITY
Start: 2023-12-15

## 2023-12-15 RX ORDER — DEXAMETHASONE 4 MG/1
500 TABLET ORAL
Refills: 0 | Status: COMPLETED | COMMUNITY
End: 2023-12-15

## 2023-12-15 RX ORDER — LOSARTAN POTASSIUM 100 %
POWDER (GRAM) MISCELLANEOUS
Refills: 0 | Status: COMPLETED | COMMUNITY
End: 2023-12-15

## 2023-12-15 NOTE — REVIEW OF SYSTEMS
[Negative] : ENT [Feeling Poorly] : not feeling poorly [Feeling Tired] : not feeling tired [Chest Pain] : no chest pain [Palpitations] : no palpitations [Shortness Of Breath] : no shortness of breath

## 2023-12-15 NOTE — PHYSICAL EXAM
[General Appearance - Well Nourished] : well nourished [General Appearance - Well Developed] : well developed [Sclera] : the sclera and conjunctiva were normal [Outer Ear] : the ears and nose were normal in appearance [Neck Appearance] : the appearance of the neck was normal [Respiration, Rhythm And Depth] : normal respiratory rhythm and effort [Auscultation Breath Sounds / Voice Sounds] : lungs were clear to auscultation bilaterally [Heart Rate And Rhythm] : heart rate was normal and rhythm regular [FreeTextEntry1] : NYHAC I   2/6 systolic murmur [Examination Of The Chest] : the chest was normal in appearance [Abnormal Walk] : normal gait [Skin Color & Pigmentation] : normal skin color and pigmentation [Sensation] : the sensory exam was normal to light touch and pinprick [Oriented To Time, Place, And Person] : oriented to person, place, and time [Impaired Insight] : insight and judgment were intact

## 2023-12-15 NOTE — HISTORY OF PRESENT ILLNESS
[FreeTextEntry1] : Mr. CATHY LEVI is a 68 year old male referred by SANDRA Alba who presents for consultation regarding mitral regurgitation.   His pertinent past medical history includes HTN, HOCM, mild CAD with evaluation by way of Cardiac CTA, paroxysmal atrial fibrillation (2009 with no reoccurrence, converted with medications).    Today, the patient reports having some chest discomforts and stressors at work which he attributed to anxiety. He had evaluation by cardiology and echocardiogram revealed mitral valve regurgitation. He still has this discomfort when he eats carbohydrates. He had recent femur fracture after a fall from a ladder in June.   Cardiology: Dr Micky Antonio

## 2023-12-15 NOTE — ASSESSMENT
[FreeTextEntry1] : Independent review of imaging and independent interpretation was performed at today's visit. We discussed that [facility] imaging reveals moderate to severe mitral valve regurgitation with systolic anterior motion of the valve.  He would likely require replacement of the valve and not replacement. He has no symptoms and reports being very active at home. At this time I am not recommending intervention, however I would like for him to have echocardiogram imaging in February, sooner if symptoms occur.   PLAN: - Transthoracic Echocardiogram in February 2024 - Return to care after imaging      I, Dr. Mejia personally performed the evaluation and management (E/M) services for this new patient. That E/M includes conducting the initial examination, assessing all conditions, and establishing the plan of care. Today, Jason Petty NP was here to observe my evaluation and management services for this patient.

## 2024-02-22 ENCOUNTER — NON-APPOINTMENT (OUTPATIENT)
Age: 69
End: 2024-02-22

## 2024-03-14 NOTE — HISTORY OF PRESENT ILLNESS
Refill    Last office visit 12/12/23.   [Clean/Dry/Intact] : clean, dry and intact [Doing Well] : is doing well [No Sign of Infection] : is showing no signs of infection [Adequate Pain Control] : has adequate pain control [Erythema] : not erythematous [Discharge] : absent of discharge [Swelling] : not swollen [Dehiscence] : not dehisced [de-identified] : pop visit [de-identified] : 67 yo m s/p  Open reduction and internal fixation, right intercondylar distal femur fracture.\par  Intramedullary nail, right femur here for pop visit.  Patient is accompanied by family member.  He is in wheelchair.  [facility] and is receiving PT.  He is WBAT in PT.  He does c/o swelling in his right leg. He reports that venous dopplers were done last week in the rehab and were negative for DVT.  He is taking oxy for pain. [de-identified] : sensation grossly intact [de-identified] : no imaging today.\par xray were brought in from outside facility done on 6/23/23  hardware in place [de-identified] : Patient will continue PT WBAT, ROM,\par elevate RLE to decrease swelling,\par no need for dressings,\par Rehab to monitor swelling and repeat venous dopplers as needed,\par Patient will call for any concerns, otherwise, he will f/u in 1 month for eval and xrays right femur.

## 2024-03-20 NOTE — ED PROVIDER NOTE - AREA
Predictive Model Details          45 (Caution)  Factor Value    Calculated 3/19/2024 23:50 25% Supplemental oxygen Nasal cannula    Deterioration Index Model 23% Age 61 years old     20% Neurological exam X     18% Respiratory rate 22     5% Systolic 147     3% Potassium 4.3 mmol/L     2% Pulse oximetry 93 %     2% Sodium 137 mmol/L     2% WBC count 8.9 K/uL     1% Pulse 87     0% Hematocrit 35.0 %     0% Temperature 98.4 °F (36.9 °C)        Problem: Discharge Planning  Goal: Discharge to home or other facility with appropriate resources  Outcome: Progressing     Problem: Safety - Adult  Goal: Free from fall injury  Outcome: Progressing     Problem: Respiratory - Adult  Goal: Achieves optimal ventilation and oxygenation  3/19/2024 2108 by Dian Marie RCP  Outcome: Progressing      generalized/upper

## 2025-03-06 NOTE — ED CDU PROVIDER INITIAL DAY NOTE - WR ORDER DATE AND TIME 1
13-Jun-2023 02:12
[FreeTextEntry2] : follow up left elbow, new injury right elbow- Pt having lateral elbow pain of the R elbow. Pt noted good relief from prev L elbow medial epicondyle inj
